# Patient Record
Sex: MALE | Race: BLACK OR AFRICAN AMERICAN | NOT HISPANIC OR LATINO | Employment: OTHER | URBAN - METROPOLITAN AREA
[De-identification: names, ages, dates, MRNs, and addresses within clinical notes are randomized per-mention and may not be internally consistent; named-entity substitution may affect disease eponyms.]

---

## 2018-01-17 ENCOUNTER — HOSPITAL ENCOUNTER (EMERGENCY)
Facility: OTHER | Age: 58
Discharge: HOME | End: 2018-01-17
Attending: EMERGENCY MEDICINE
Payer: MEDICAID

## 2018-01-17 VITALS
SYSTOLIC BLOOD PRESSURE: 153 MMHG | TEMPERATURE: 98 F | DIASTOLIC BLOOD PRESSURE: 60 MMHG | OXYGEN SATURATION: 98 % | HEIGHT: 69 IN | RESPIRATION RATE: 19 BRPM | HEART RATE: 68 BPM | BODY MASS INDEX: 22.22 KG/M2 | WEIGHT: 150 LBS

## 2018-01-17 DIAGNOSIS — M62.838 NECK MUSCLE SPASM: Primary | ICD-10-CM

## 2018-01-17 DIAGNOSIS — B07.9 FILIFORM WARTS: ICD-10-CM

## 2018-01-17 PROCEDURE — 99283 EMERGENCY DEPT VISIT LOW MDM: CPT

## 2018-01-17 PROCEDURE — 25000003 PHARM REV CODE 250: Performed by: PHYSICIAN ASSISTANT

## 2018-01-17 RX ORDER — BENZTROPINE MESYLATE 2 MG/1
1 TABLET ORAL 2 TIMES DAILY
COMMUNITY
End: 2020-02-19

## 2018-01-17 RX ORDER — METHOCARBAMOL 500 MG/1
1000 TABLET, FILM COATED ORAL
Status: COMPLETED | OUTPATIENT
Start: 2018-01-17 | End: 2018-01-17

## 2018-01-17 RX ORDER — DIPHENHYDRAMINE HCL 25 MG
25 TABLET ORAL 2 TIMES DAILY
COMMUNITY
End: 2019-04-12

## 2018-01-17 RX ORDER — IBUPROFEN 600 MG/1
600 TABLET ORAL
Status: COMPLETED | OUTPATIENT
Start: 2018-01-17 | End: 2018-01-17

## 2018-01-17 RX ORDER — METHOCARBAMOL 500 MG/1
1000 TABLET, FILM COATED ORAL 3 TIMES DAILY
Qty: 24 TABLET | Refills: 0 | Status: SHIPPED | OUTPATIENT
Start: 2018-01-17 | End: 2018-01-22

## 2018-01-17 RX ADMIN — METHOCARBAMOL 1000 MG: 500 TABLET ORAL at 01:01

## 2018-01-17 RX ADMIN — IBUPROFEN 600 MG: 600 TABLET, FILM COATED ORAL at 01:01

## 2018-01-17 NOTE — ED PROVIDER NOTES
"Encounter Date: 1/17/2018       History     Chief Complaint   Patient presents with    Multiple Medical Complaints     upper shoulder/back pain x 2 weeks and "I got these things growing on my face"     Patient is a 57-year-old male with schizophrenia who presents to the ED with neck pain and lesions on his face.  Patient states he woke up 3 days ago with right-sided neck pain that radiates into his right shoulder.  He denies any injury.  He states it feels like a "muscle spasm".  He reports taking ibuprofen and applying analgesic gel to this area without relief.  He denies any numbness or tingling.  He also reports three-month history of lesions to his chin.  He denies any irritation, drainage, swelling, or bleeding to these lesions.  He states these lesions were exacerbated after shaving.  He denies any genital lesions.  Upon reviewing old medical records, patient has been seen here for similar presentation and was diagnosed with cutaneous warts.  Patient denies receiving any prior treatment for this in the past.      The history is provided by the patient.     Review of patient's allergies indicates:  No Known Allergies  Past Medical History:   Diagnosis Date    Schizophrenia      History reviewed. No pertinent surgical history.  History reviewed. No pertinent family history.  Social History   Substance Use Topics    Smoking status: Current Every Day Smoker    Smokeless tobacco: Not on file    Alcohol use No     Review of Systems   Constitutional: Negative for chills and fever.   HENT: Negative for congestion and sore throat.    Eyes: Negative for pain.   Respiratory: Negative for shortness of breath.    Cardiovascular: Negative for chest pain.   Gastrointestinal: Negative for abdominal pain, diarrhea, nausea and vomiting.   Genitourinary: Negative for dysuria.   Musculoskeletal: Positive for neck pain.   Skin:        Skin lesions to face   Neurological: Negative for headaches.       Physical Exam     Initial " Vitals [01/17/18 1138]   BP Pulse Resp Temp SpO2   (!) 146/68 65 16 97.2 °F (36.2 °C) 100 %      MAP       94         Physical Exam    Constitutional: He is not diaphoretic. He is cooperative. No distress.   HENT:   Head: Normocephalic and atraumatic.   Mouth/Throat: Oropharynx is clear and moist.   Eyes: Conjunctivae and EOM are normal. Pupils are equal, round, and reactive to light.   Neck: Normal range of motion. Neck supple.   Cardiovascular: Normal rate, regular rhythm and intact distal pulses.   Pulmonary/Chest: Breath sounds normal. He has no wheezes. He has no rhonchi. He has no rales.   Abdominal: Soft. Bowel sounds are normal. There is no tenderness. There is no rebound and no guarding.   Musculoskeletal:   Full range of motion to right shoulder.  No pain with internal/external rotation.  No obvious deformity. No CTL midline tenderness, crepitus, masses, or step-offs.  Pain with palpation to the right sternocleidomastoid muscle with associated muscle spasm.   Neurological: He is alert and oriented to person, place, and time. He has normal strength. GCS eye subscore is 4. GCS verbal subscore is 5. GCS motor subscore is 6.   Skin: Skin is warm and dry. Capillary refill takes less than 2 seconds.   Multiple, verrucous papular lesions to the chin. No erythema, pustules or drainage   Psychiatric: He has a normal mood and affect. His behavior is normal.         ED Course   Procedures  Labs Reviewed - No data to display          Medical Decision Making:   Initial Assessment:   Urgent evaluation of a 57 y.o. male presenting to the emergency department complaining of facial lesions the neck pain. Patient is afebrile, nontoxic appearing and hemodynamically stable.  ED Management:  Physical exam is consistent with muscle spasm of neck.  There are no signs of saddle anesthesia, incontinence, neurologic deficits, fevers, trauma or midline tenderness on history or physical to suggest cauda equina, infectious process,  fracture or subluxation.  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  He denies genital lesions Advised that he follow-up with his primary care for cryotherapy or pharmacological intervention.  I'll send him home with muscle relaxers for his neck spasm.  Have given specific return cautions for new or worsening symptoms.  Other:   I have discussed this case with another health care provider.  This note was created using Dragon Medical Dictation. There may be typographical errors secondary to dictation.                    ED Course      Clinical Impression:     1. Neck muscle spasm    2. Filiform warts                             Parvez Rebollar PA-C  01/17/18 6747

## 2019-03-14 ENCOUNTER — HOSPITAL ENCOUNTER (EMERGENCY)
Facility: OTHER | Age: 59
Discharge: HOME | End: 2019-03-14
Attending: EMERGENCY MEDICINE
Payer: MEDICAID

## 2019-03-14 VITALS
DIASTOLIC BLOOD PRESSURE: 64 MMHG | OXYGEN SATURATION: 97 % | TEMPERATURE: 98 F | RESPIRATION RATE: 17 BRPM | BODY MASS INDEX: 20.73 KG/M2 | HEART RATE: 56 BPM | SYSTOLIC BLOOD PRESSURE: 140 MMHG | WEIGHT: 140 LBS | HEIGHT: 69 IN

## 2019-03-14 DIAGNOSIS — M79.89 CALF SWELLING: Primary | ICD-10-CM

## 2019-03-14 DIAGNOSIS — M79.605 PAIN OF LEFT LOWER EXTREMITY: ICD-10-CM

## 2019-03-14 LAB
ALBUMIN SERPL BCP-MCNC: 3.9 G/DL
ALP SERPL-CCNC: 82 U/L
ALT SERPL W/O P-5'-P-CCNC: 13 U/L
ANION GAP SERPL CALC-SCNC: 8 MMOL/L
APTT BLDCRRT: 27.1 SEC
AST SERPL-CCNC: 19 U/L
BASOPHILS # BLD AUTO: 0.01 K/UL
BASOPHILS NFR BLD: 0.3 %
BILIRUB SERPL-MCNC: 0.5 MG/DL
BUN SERPL-MCNC: 6 MG/DL
CALCIUM SERPL-MCNC: 9.4 MG/DL
CHLORIDE SERPL-SCNC: 98 MMOL/L
CO2 SERPL-SCNC: 27 MMOL/L
CREAT SERPL-MCNC: 1.1 MG/DL
DIFFERENTIAL METHOD: ABNORMAL
EOSINOPHIL # BLD AUTO: 0.1 K/UL
EOSINOPHIL NFR BLD: 3.3 %
ERYTHROCYTE [DISTWIDTH] IN BLOOD BY AUTOMATED COUNT: 12.9 %
EST. GFR  (AFRICAN AMERICAN): >60 ML/MIN/1.73 M^2
EST. GFR  (NON AFRICAN AMERICAN): >60 ML/MIN/1.73 M^2
GLUCOSE SERPL-MCNC: 87 MG/DL
HCT VFR BLD AUTO: 38.2 %
HGB BLD-MCNC: 12.7 G/DL
HIV1+2 IGG SERPL QL IA.RAPID: NEGATIVE
INR PPP: 1
LYMPHOCYTES # BLD AUTO: 0.4 K/UL
LYMPHOCYTES NFR BLD: 12.3 %
MCH RBC QN AUTO: 28.7 PG
MCHC RBC AUTO-ENTMCNC: 33.2 G/DL
MCV RBC AUTO: 86 FL
MONOCYTES # BLD AUTO: 0.2 K/UL
MONOCYTES NFR BLD: 6.3 %
NEUTROPHILS # BLD AUTO: 2.6 K/UL
NEUTROPHILS NFR BLD: 77.5 %
PLATELET # BLD AUTO: 269 K/UL
PMV BLD AUTO: 9.9 FL
POTASSIUM SERPL-SCNC: 4.8 MMOL/L
PROT SERPL-MCNC: 7.3 G/DL
PROTHROMBIN TIME: 11 SEC
RBC # BLD AUTO: 4.42 M/UL
SODIUM SERPL-SCNC: 133 MMOL/L
WBC # BLD AUTO: 3.32 K/UL

## 2019-03-14 PROCEDURE — 86703 HIV-1/HIV-2 1 RESULT ANTBDY: CPT | Mod: 91

## 2019-03-14 PROCEDURE — 86703 HIV-1/HIV-2 1 RESULT ANTBDY: CPT

## 2019-03-14 PROCEDURE — 36415 COLL VENOUS BLD VENIPUNCTURE: CPT

## 2019-03-14 PROCEDURE — 80053 COMPREHEN METABOLIC PANEL: CPT

## 2019-03-14 PROCEDURE — 85025 COMPLETE CBC W/AUTO DIFF WBC: CPT

## 2019-03-14 PROCEDURE — 85610 PROTHROMBIN TIME: CPT

## 2019-03-14 PROCEDURE — 85730 THROMBOPLASTIN TIME PARTIAL: CPT

## 2019-03-14 PROCEDURE — 99284 EMERGENCY DEPT VISIT MOD MDM: CPT

## 2019-03-14 RX ORDER — IBUPROFEN 600 MG/1
600 TABLET ORAL EVERY 6 HOURS PRN
Qty: 20 TABLET | Refills: 0 | Status: SHIPPED | OUTPATIENT
Start: 2019-03-14 | End: 2019-04-12

## 2019-03-14 RX ORDER — METHOCARBAMOL 500 MG/1
1000 TABLET, FILM COATED ORAL 3 TIMES DAILY PRN
Qty: 30 TABLET | Refills: 0 | Status: SHIPPED | OUTPATIENT
Start: 2019-03-14 | End: 2019-03-19

## 2019-03-14 NOTE — ED PROVIDER NOTES
"Encounter Date: 3/14/2019    SCRIBE #1 NOTE: I, Uyen Flores, am scribing for, and in the presence of, Dr. Umana.       History     Chief Complaint   Patient presents with    Leg Pain     Pt c/o left knee and left calf pain for the past three weeks. Pt also c/o multiple "warts to face."     Time seen by provider: 11:22 AM    This is a 58 y.o. male with hx of schizophrenia who presents with complaint of LLE pain for one week. He states that pain is to the entire leg, from hip to ankle. Pain is exacerbated with ambulation. He reports associated swelling to the calf. Pt also c/o warts to the chin and around the month, ongoing for many months. He states that warts are not painful. Pt reports no fever, chills, N/V, abdominal pain, chest pain, SOB, cough, neck pain, HA, numbness, tingling, weakness, wound, or confusion. Pt admits to smoking, but does not drink or use illicit drugs.      The history is provided by the patient.     Review of patient's allergies indicates:  No Known Allergies  Past Medical History:   Diagnosis Date    Schizophrenia      History reviewed. No pertinent surgical history.  History reviewed. No pertinent family history.  Social History     Tobacco Use    Smoking status: Current Every Day Smoker   Substance Use Topics    Alcohol use: No    Drug use: No     Review of Systems   Constitutional: Negative for chills and fever.   HENT: Negative for congestion and sore throat.    Eyes: Negative for visual disturbance.   Respiratory: Negative for cough and shortness of breath.    Cardiovascular: Positive for leg swelling (LLE). Negative for chest pain and palpitations.   Gastrointestinal: Negative for abdominal pain, diarrhea, nausea and vomiting.   Genitourinary: Negative for decreased urine volume, dysuria and frequency.   Musculoskeletal: Negative for joint swelling, neck pain and neck stiffness.        Positive for LLE pain.   Skin: Negative for wound.        Positive for warts to the face. "   Neurological: Negative for weakness, numbness and headaches.        Negative for tingling.   Psychiatric/Behavioral: Negative for behavioral problems and confusion.       Physical Exam     Initial Vitals [03/14/19 1051]   BP Pulse Resp Temp SpO2   101/64 104 18 98.5 °F (36.9 °C) 95 %      MAP       --         Physical Exam    Nursing note and vitals reviewed.  Constitutional: He appears well-developed and well-nourished. No distress.   HENT:   Head: Normocephalic and atraumatic.   Cobblestoning and posterior erythema. No tonsillar enlargement or exudate.   Eyes: Conjunctivae and EOM are normal. Pupils are equal, round, and reactive to light.   Neck: Normal range of motion. Neck supple.   Cardiovascular: Normal rate, regular rhythm and normal heart sounds.   No murmur heard.  DP pulses 2+ and equal.   Pulmonary/Chest: Breath sounds normal. No respiratory distress. He has no wheezes. He has no rhonchi. He has no rales.   Abdominal: Soft. Bowel sounds are normal. There is no tenderness.   Musculoskeletal: Normal range of motion.   Neurological: He is alert and oriented to person, place, and time. He has normal strength. No cranial nerve deficit or sensory deficit.   No foot drop.  Negative Keenan test.   Skin: Skin is warm and dry. No rash noted.   L calf increased in size when compared to the R. Compartments are soft.  Both extremities are equally warm. No erythema. Dried skin over bilateral shin.   There are multiple papules with verrucous filiform morphology, diffusely over the pt's beard area and one on top of the L pinna of the ear. No erythema or purulence.   Psychiatric: He has a normal mood and affect. His behavior is normal.         ED Course   Procedures  Labs Reviewed   CBC W/ AUTO DIFFERENTIAL - Abnormal; Notable for the following components:       Result Value    WBC 3.32 (*)     RBC 4.42 (*)     Hemoglobin 12.7 (*)     Hematocrit 38.2 (*)     Lymph # 0.4 (*)     Mono # 0.2 (*)     Gran% 77.5 (*)      Lymph% 12.3 (*)     All other components within normal limits   COMPREHENSIVE METABOLIC PANEL - Abnormal; Notable for the following components:    Sodium 133 (*)     All other components within normal limits   PROTIME-INR    Narrative:     Recoll. [id number] by PLM at 03/14/2019 12:36, reason: Overfilled.   Spoke with Codi Calvert RN ER at 1236. Lab will recollect.   APTT    Narrative:     Recoll. [id number] by PLM at 03/14/2019 12:36, reason: Overfilled.   Spoke with Codi Calvert RN ER at 1236. Lab will recollect.   RAPID HIV   HIV 1 / 2 ANTIBODY          Imaging Results          US Lower Extremity Veins Left (Final result)  Result time 03/14/19 13:39:21    Final result by Ulysses España MD (03/14/19 13:39:21)                 Impression:      No evidence of deep venous thrombosis in the left lower extremity.      Electronically signed by: Ulysses España MD  Date:    03/14/2019  Time:    13:39             Narrative:    EXAMINATION:  US LOWER EXTREMITY VEINS LEFT    CLINICAL HISTORY:  Other specified soft tissue disorders    TECHNIQUE:  Duplex and color flow Doppler evaluation and graded compression of the left lower extremity veins was performed.    COMPARISON:  None    FINDINGS:  Left thigh veins: The common femoral, femoral, popliteal, upper greater saphenous, and deep femoral veins are patent and free of thrombus. The veins are normally compressible and have normal phasic flow and augmentation response.    Left calf veins: The visualized calf veins are patent.    Contralateral CFV: The contralateral (right) common femoral vein is patent and free of thrombus.    Miscellaneous: None                                 Medical Decision Making:   Clinical Tests:   Lab Tests: Ordered and Reviewed  Radiological Study: Ordered  ED Management:  Emergent evaluation a 58-year-old male who presents with complaint of left calf pain and swelling.  Vital signs are benign, afebrile.  Physical exam reveals a swollen left calf a,  although compartments are soft.  No evidence of Achilles rupture.  No evidence of cellulitis.  No evidence of vascular compromise.  I was concerned for a DVT.  Lab workup and ultrasound are benign.  Ultimately he is discharged in good condition with an anti-inflammatory and Robaxin for muscle relaxer.  I have encouraged close follow-up with PCP or to return for any new or worsening symptoms.            Scribe Attestation:   Scribe #1: I performed the above scribed service and the documentation accurately describes the services I performed. I attest to the accuracy of the note.    Attending Attestation:           Physician Attestation for Scribe:  Physician Attestation Statement for Scribe #1: I, Dr. Umana, reviewed documentation, as scribed by Uyen Flores in my presence, and it is both accurate and complete.                    Clinical Impression:     1. Calf swelling    2. Pain of left lower extremity                                 Luli Umana MD  03/14/19 0682

## 2019-03-14 NOTE — ED TRIAGE NOTES
PT states he does a lot of walking, reports pain to left calf x 2 to 3 weeks, w/ swelling noted to left calf. Pt reports 10 out of 10 pain w/ walking. PT is AAO x 3, answers questions appropriately.

## 2019-03-15 LAB — HIV 1+2 AB+HIV1 P24 AG SERPL QL IA: NEGATIVE

## 2019-04-12 ENCOUNTER — HOSPITAL ENCOUNTER (INPATIENT)
Facility: OTHER | Age: 59
LOS: 2 days | Discharge: HOME | DRG: 641 | End: 2019-04-14
Attending: EMERGENCY MEDICINE | Admitting: EMERGENCY MEDICINE
Payer: MEDICAID

## 2019-04-12 DIAGNOSIS — D64.9 ANEMIA, UNSPECIFIED TYPE: ICD-10-CM

## 2019-04-12 DIAGNOSIS — E88.09 HYPOALBUMINEMIA: ICD-10-CM

## 2019-04-12 DIAGNOSIS — B07.9 FILIFORM WARTS: ICD-10-CM

## 2019-04-12 DIAGNOSIS — R62.7 FAILURE TO THRIVE IN ADULT: ICD-10-CM

## 2019-04-12 DIAGNOSIS — E86.1 HYPOTENSION DUE TO HYPOVOLEMIA: ICD-10-CM

## 2019-04-12 DIAGNOSIS — I95.9 HYPOTENSION, UNSPECIFIED HYPOTENSION TYPE: ICD-10-CM

## 2019-04-12 DIAGNOSIS — F20.9 SCHIZOPHRENIA, UNSPECIFIED TYPE: ICD-10-CM

## 2019-04-12 DIAGNOSIS — E87.1 HYPONATREMIA: Primary | ICD-10-CM

## 2019-04-12 LAB
ALBUMIN SERPL BCP-MCNC: 2.7 G/DL (ref 3.5–5.2)
ALP SERPL-CCNC: 95 U/L (ref 55–135)
ALT SERPL W/O P-5'-P-CCNC: 85 U/L (ref 10–44)
ANION GAP SERPL CALC-SCNC: 11 MMOL/L (ref 8–16)
ANION GAP SERPL CALC-SCNC: 9 MMOL/L (ref 8–16)
AST SERPL-CCNC: 86 U/L (ref 10–40)
BACTERIA #/AREA URNS HPF: ABNORMAL /HPF
BASOPHILS # BLD AUTO: 0.01 K/UL (ref 0–0.2)
BASOPHILS NFR BLD: 0.2 % (ref 0–1.9)
BILIRUB SERPL-MCNC: 1.2 MG/DL (ref 0.1–1)
BILIRUB UR QL STRIP: NEGATIVE
BUN SERPL-MCNC: 23 MG/DL (ref 6–20)
BUN SERPL-MCNC: 25 MG/DL (ref 6–20)
CALCIUM SERPL-MCNC: 8.6 MG/DL (ref 8.7–10.5)
CALCIUM SERPL-MCNC: 8.8 MG/DL (ref 8.7–10.5)
CHLORIDE SERPL-SCNC: 87 MMOL/L (ref 95–110)
CHLORIDE SERPL-SCNC: 90 MMOL/L (ref 95–110)
CLARITY UR: ABNORMAL
CO2 SERPL-SCNC: 22 MMOL/L (ref 23–29)
CO2 SERPL-SCNC: 27 MMOL/L (ref 23–29)
COLOR UR: YELLOW
CREAT SERPL-MCNC: 1.2 MG/DL (ref 0.5–1.4)
CREAT SERPL-MCNC: 1.4 MG/DL (ref 0.5–1.4)
DIFFERENTIAL METHOD: ABNORMAL
EOSINOPHIL # BLD AUTO: 0 K/UL (ref 0–0.5)
EOSINOPHIL NFR BLD: 0.5 % (ref 0–8)
ERYTHROCYTE [DISTWIDTH] IN BLOOD BY AUTOMATED COUNT: 13.1 % (ref 11.5–14.5)
EST. GFR  (AFRICAN AMERICAN): >60 ML/MIN/1.73 M^2
EST. GFR  (AFRICAN AMERICAN): >60 ML/MIN/1.73 M^2
EST. GFR  (NON AFRICAN AMERICAN): 55 ML/MIN/1.73 M^2
EST. GFR  (NON AFRICAN AMERICAN): >60 ML/MIN/1.73 M^2
GLUCOSE SERPL-MCNC: 77 MG/DL (ref 70–110)
GLUCOSE SERPL-MCNC: 96 MG/DL (ref 70–110)
GLUCOSE UR QL STRIP: NEGATIVE
HCT VFR BLD AUTO: 26.1 % (ref 40–54)
HGB BLD-MCNC: 9.1 G/DL (ref 14–18)
HGB UR QL STRIP: ABNORMAL
KETONES UR QL STRIP: NEGATIVE
LEUKOCYTE ESTERASE UR QL STRIP: ABNORMAL
LYMPHOCYTES # BLD AUTO: 0.2 K/UL (ref 1–4.8)
LYMPHOCYTES NFR BLD: 2.7 % (ref 18–48)
MCH RBC QN AUTO: 28.6 PG (ref 27–31)
MCHC RBC AUTO-ENTMCNC: 34.9 G/DL (ref 32–36)
MCV RBC AUTO: 82 FL (ref 82–98)
MICROSCOPIC COMMENT: ABNORMAL
MONOCYTES # BLD AUTO: 0.5 K/UL (ref 0.3–1)
MONOCYTES NFR BLD: 8.6 % (ref 4–15)
NEUTROPHILS # BLD AUTO: 5.2 K/UL (ref 1.8–7.7)
NEUTROPHILS NFR BLD: 87.5 % (ref 38–73)
NITRITE UR QL STRIP: POSITIVE
OSMOLALITY SERPL: 257 MOSM/KG (ref 280–300)
PH UR STRIP: 6 [PH] (ref 5–8)
PLATELET # BLD AUTO: 201 K/UL (ref 150–350)
PMV BLD AUTO: 10.5 FL (ref 9.2–12.9)
POTASSIUM SERPL-SCNC: 3.8 MMOL/L (ref 3.5–5.1)
POTASSIUM SERPL-SCNC: 4.2 MMOL/L (ref 3.5–5.1)
PROT SERPL-MCNC: 6.3 G/DL (ref 6–8.4)
PROT UR QL STRIP: ABNORMAL
RBC # BLD AUTO: 3.18 M/UL (ref 4.6–6.2)
RBC #/AREA URNS HPF: 9 /HPF (ref 0–4)
SODIUM SERPL-SCNC: 123 MMOL/L (ref 136–145)
SODIUM SERPL-SCNC: 123 MMOL/L (ref 136–145)
SP GR UR STRIP: <=1.005 (ref 1–1.03)
SQUAMOUS #/AREA URNS HPF: 3 /HPF
URN SPEC COLLECT METH UR: ABNORMAL
UROBILINOGEN UR STRIP-ACNC: 1 EU/DL
WBC # BLD AUTO: 5.93 K/UL (ref 3.9–12.7)
WBC #/AREA URNS HPF: >100 /HPF (ref 0–5)
WBC CLUMPS URNS QL MICRO: ABNORMAL

## 2019-04-12 PROCEDURE — 87186 SC STD MICRODIL/AGAR DIL: CPT

## 2019-04-12 PROCEDURE — 87077 CULTURE AEROBIC IDENTIFY: CPT

## 2019-04-12 PROCEDURE — 83930 ASSAY OF BLOOD OSMOLALITY: CPT

## 2019-04-12 PROCEDURE — 80053 COMPREHEN METABOLIC PANEL: CPT

## 2019-04-12 PROCEDURE — 83935 ASSAY OF URINE OSMOLALITY: CPT

## 2019-04-12 PROCEDURE — 99223 1ST HOSP IP/OBS HIGH 75: CPT | Mod: ,,, | Performed by: HOSPITALIST

## 2019-04-12 PROCEDURE — 36415 COLL VENOUS BLD VENIPUNCTURE: CPT

## 2019-04-12 PROCEDURE — 99285 EMERGENCY DEPT VISIT HI MDM: CPT | Mod: 25

## 2019-04-12 PROCEDURE — 63600175 PHARM REV CODE 636 W HCPCS: Performed by: EMERGENCY MEDICINE

## 2019-04-12 PROCEDURE — 85025 COMPLETE CBC W/AUTO DIFF WBC: CPT

## 2019-04-12 PROCEDURE — 87088 URINE BACTERIA CULTURE: CPT

## 2019-04-12 PROCEDURE — 81000 URINALYSIS NONAUTO W/SCOPE: CPT

## 2019-04-12 PROCEDURE — 94761 N-INVAS EAR/PLS OXIMETRY MLT: CPT

## 2019-04-12 PROCEDURE — 87086 URINE CULTURE/COLONY COUNT: CPT

## 2019-04-12 PROCEDURE — 25000003 PHARM REV CODE 250: Performed by: HOSPITALIST

## 2019-04-12 PROCEDURE — 25000003 PHARM REV CODE 250: Performed by: EMERGENCY MEDICINE

## 2019-04-12 PROCEDURE — 80048 BASIC METABOLIC PNL TOTAL CA: CPT

## 2019-04-12 PROCEDURE — 11000001 HC ACUTE MED/SURG PRIVATE ROOM

## 2019-04-12 PROCEDURE — 96361 HYDRATE IV INFUSION ADD-ON: CPT

## 2019-04-12 PROCEDURE — 96365 THER/PROPH/DIAG IV INF INIT: CPT

## 2019-04-12 PROCEDURE — 99223 PR INITIAL HOSPITAL CARE,LEVL III: ICD-10-PCS | Mod: ,,, | Performed by: HOSPITALIST

## 2019-04-12 RX ORDER — DIPHENHYDRAMINE HCL 25 MG
25 CAPSULE ORAL EVERY 6 HOURS PRN
Status: DISCONTINUED | OUTPATIENT
Start: 2019-04-12 | End: 2019-04-14 | Stop reason: HOSPADM

## 2019-04-12 RX ORDER — SODIUM CHLORIDE, SODIUM LACTATE, POTASSIUM CHLORIDE, CALCIUM CHLORIDE 600; 310; 30; 20 MG/100ML; MG/100ML; MG/100ML; MG/100ML
INJECTION, SOLUTION INTRAVENOUS CONTINUOUS
Status: DISCONTINUED | OUTPATIENT
Start: 2019-04-12 | End: 2019-04-14 | Stop reason: HOSPADM

## 2019-04-12 RX ORDER — SODIUM CHLORIDE 0.9 % (FLUSH) 0.9 %
10 SYRINGE (ML) INJECTION
Status: DISCONTINUED | OUTPATIENT
Start: 2019-04-12 | End: 2019-04-14 | Stop reason: HOSPADM

## 2019-04-12 RX ORDER — HALOPERIDOL 5 MG/1
10 TABLET ORAL 2 TIMES DAILY
Status: DISCONTINUED | OUTPATIENT
Start: 2019-04-12 | End: 2019-04-14 | Stop reason: HOSPADM

## 2019-04-12 RX ORDER — ONDANSETRON 2 MG/ML
4 INJECTION INTRAMUSCULAR; INTRAVENOUS EVERY 8 HOURS PRN
Status: DISCONTINUED | OUTPATIENT
Start: 2019-04-12 | End: 2019-04-14 | Stop reason: HOSPADM

## 2019-04-12 RX ORDER — BENZTROPINE MESYLATE 1 MG/1
1 TABLET ORAL 2 TIMES DAILY
Status: DISCONTINUED | OUTPATIENT
Start: 2019-04-12 | End: 2019-04-14 | Stop reason: HOSPADM

## 2019-04-12 RX ORDER — ACETAMINOPHEN 325 MG/1
650 TABLET ORAL EVERY 4 HOURS PRN
Status: DISCONTINUED | OUTPATIENT
Start: 2019-04-12 | End: 2019-04-14 | Stop reason: HOSPADM

## 2019-04-12 RX ADMIN — SODIUM CHLORIDE 1000 ML: 0.9 INJECTION, SOLUTION INTRAVENOUS at 10:04

## 2019-04-12 RX ADMIN — CEFTRIAXONE 1 G: 1 INJECTION, SOLUTION INTRAVENOUS at 02:04

## 2019-04-12 RX ADMIN — BENZTROPINE MESYLATE 1 MG: 1 TABLET ORAL at 09:04

## 2019-04-12 RX ADMIN — HALOPERIDOL 10 MG: 5 TABLET ORAL at 09:04

## 2019-04-12 RX ADMIN — SODIUM CHLORIDE, SODIUM LACTATE, POTASSIUM CHLORIDE, AND CALCIUM CHLORIDE: 600; 310; 30; 20 INJECTION, SOLUTION INTRAVENOUS at 04:04

## 2019-04-12 NOTE — SUBJECTIVE & OBJECTIVE
Past Medical History:   Diagnosis Date    Schizophrenia        Past Surgical History:   Procedure Laterality Date    CLAVICLE SURGERY Right 1976       Review of patient's allergies indicates:  No Known Allergies    No current facility-administered medications on file prior to encounter.      Current Outpatient Medications on File Prior to Encounter   Medication Sig    benztropine (COGENTIN) 2 MG Tab Take 1 mg by mouth 2 (two) times daily.    haloperidol (HALDOL) 10 MG tablet Take 10 mg by mouth 2 (two) times daily.     [DISCONTINUED] diphenhydrAMINE (SOMINEX) 25 mg tablet Take 25 mg by mouth 2 (two) times daily.    [DISCONTINUED] ibuprofen (ADVIL,MOTRIN) 600 MG tablet Take 1 tablet (600 mg total) by mouth every 6 (six) hours as needed for Pain.     Family History     Problem Relation (Age of Onset)    Cancer Mother        Tobacco Use    Smoking status: Current Every Day Smoker     Types: Cigarettes    Smokeless tobacco: Never Used    Tobacco comment: Reports he quit around 4/1/19   Substance and Sexual Activity    Alcohol use: No    Drug use: No    Sexual activity: Not on file     Review of Systems   Constitutional: Positive for appetite change. Negative for chills and fever.   HENT: Negative for rhinorrhea and sore throat.         Dysgeusia   Eyes: Negative for photophobia and visual disturbance.   Respiratory: Negative for cough and shortness of breath.    Cardiovascular: Negative for chest pain and palpitations.   Gastrointestinal: Negative for diarrhea and nausea.   Genitourinary: Negative for dysuria and frequency.   Musculoskeletal: Negative for back pain and gait problem.   Neurological: Negative for weakness and headaches.   Psychiatric/Behavioral: Negative for confusion and dysphoric mood.     Objective:     Vital Signs (Most Recent):  Temp: 99.2 °F (37.3 °C) (04/12/19 1013)  Pulse: 64 (04/12/19 1354)  Resp: 18 (04/12/19 1013)  BP: 104/63 (04/12/19 1354)  SpO2: 99 % (04/12/19 1354) Vital Signs  (24h Range):  Temp:  [99.2 °F (37.3 °C)] 99.2 °F (37.3 °C)  Pulse:  [62-94] 64  Resp:  [18] 18  SpO2:  [99 %-100 %] 99 %  BP: ()/(54-71) 104/63     Weight: 64 kg (141 lb)  Body mass index is 20.82 kg/m².    Physical Exam   Constitutional: He is oriented to person, place, and time. He appears well-developed and well-nourished. No distress.   HENT:   Head: Normocephalic.   Edentulous   Eyes: Pupils are equal, round, and reactive to light. Conjunctivae are normal.   Neck: Neck supple. No thyromegaly present.   Cardiovascular: Normal rate, regular rhythm and normal heart sounds. Exam reveals no gallop and no friction rub.   No murmur heard.  Pulses:       Dorsalis pedis pulses are 1+ on the right side, and 1+ on the left side.   Pulmonary/Chest: Effort normal and breath sounds normal.   Abdominal: Soft. Bowel sounds are normal. He exhibits no distension. There is no tenderness.   Musculoskeletal: Normal range of motion. He exhibits no edema.   Lymphadenopathy:     He has no cervical adenopathy.   Neurological: He is alert and oriented to person, place, and time.   Strength equal and symmetric   Skin: Skin is warm and dry. No rash noted.   Multiple verrucous lesions in beard distribution of face.  Skin of lower legs dry and flaking.   Psychiatric: He has a normal mood and affect. His behavior is normal. Thought content normal.         CRANIAL NERVES     CN III, IV, VI   Pupils are equal, round, and reactive to light.       Significant Labs:   BMP:   Recent Labs   Lab 04/12/19  1054   GLU 96   *   K 3.8   CL 87*   CO2 27   BUN 25*   CREATININE 1.4   CALCIUM 8.6*       Significant Imaging: I have reviewed all pertinent imaging results/findings within the past 24 hours.

## 2019-04-12 NOTE — ED PROVIDER NOTES
"Encounter Date: 4/12/2019    SCRIBE #1 NOTE: I, Uyen Flores, am scribing for, and in the presence of, Dr. Duffy.       History     Chief Complaint   Patient presents with    Fatigue     Pt reports generalized weakness for the past several days. Pt reports " I just dont eat because the food taste bad and I have a bad taste in my mouth" Pt denies vomiting recently. Unsure of diarrhea.      Time seen by provider: 10:35 AM    This is a 58 y.o. male who presents with complaint of fatigue for the past few days. Family member at bedside states that he is also not eating as his food "wasn't tasting right." Pt reports associated diarrhea with "watery" BMs. Pt was seen in this ED on 3/14/19 for LLE pain, started on ibuprofen q8h and robaxin. He has not been taking these medications for a week now. Family reports no other medication changes. Pt reports no f/c, N/V, abdominal pain, chest pain, leg swelling, SOB, back pain, HA, or weakness. No urinary sx. Pt admits to smoking.    The history is provided by the patient and a relative.     Review of patient's allergies indicates:  No Known Allergies  Past Medical History:   Diagnosis Date    Schizophrenia      Past Surgical History:   Procedure Laterality Date    CLAVICLE SURGERY Right 1976     Family History   Problem Relation Age of Onset    Cancer Mother         Unknown type     Social History     Tobacco Use    Smoking status: Current Every Day Smoker     Types: Cigarettes    Smokeless tobacco: Never Used    Tobacco comment: Reports he quit around 4/1/19   Substance Use Topics    Alcohol use: No    Drug use: No     Review of Systems   Constitutional: Positive for fatigue. Negative for chills and fever.   HENT: Negative for congestion, rhinorrhea and sore throat.    Eyes: Negative for photophobia and redness.   Respiratory: Negative for cough and shortness of breath.    Cardiovascular: Negative for chest pain.   Gastrointestinal: Positive for diarrhea. Negative " for abdominal pain, nausea and vomiting.   Endocrine: Negative for polyuria.   Genitourinary: Negative for decreased urine volume and dysuria.   Musculoskeletal: Negative for back pain.   Skin: Negative for rash.   Allergic/Immunologic: Negative for immunocompromised state.   Neurological: Negative for dizziness, weakness, light-headedness and headaches.   Hematological: Does not bruise/bleed easily.   Psychiatric/Behavioral: Negative for confusion.       Physical Exam     Initial Vitals [04/12/19 1013]   BP Pulse Resp Temp SpO2   (!) 85/54 94 18 99.2 °F (37.3 °C) 100 %      MAP       --         Physical Exam    Nursing note and vitals reviewed.  Constitutional: He appears well-developed and well-nourished. He is not diaphoretic. No distress.   HENT:   Head: Normocephalic and atraumatic.   Right Ear: External ear normal.   Left Ear: External ear normal.   Mouth/Throat: Oropharynx is clear and moist.   Eyes: Conjunctivae and EOM are normal. Pupils are equal, round, and reactive to light.   Neck: Normal range of motion. Neck supple.   Cardiovascular: Normal rate, regular rhythm and normal heart sounds.   Normal S1, S2. No murmurs, no rubs, no gallops.   Pulmonary/Chest: Breath sounds normal. No respiratory distress. He has no wheezes. He has no rhonchi. He has no rales.   Abdominal: Soft. Bowel sounds are normal. He exhibits no distension. There is no tenderness. There is no rebound and no guarding.   Musculoskeletal: Normal range of motion. He exhibits no edema or tenderness.   Lymphadenopathy:     He has no cervical adenopathy.   Neurological: He is alert and oriented to person, place, and time. He has normal strength. No sensory deficit.   Skin: Skin is warm and dry. No rash noted. No erythema.   Psychiatric: He has a normal mood and affect. His behavior is normal. Thought content normal.         ED Course   Procedures  Labs Reviewed   URINALYSIS, REFLEX TO URINE CULTURE - Abnormal; Notable for the following  components:       Result Value    Appearance, UA Hazy (*)     Specific Gravity, UA <=1.005 (*)     Protein, UA Trace (*)     Occult Blood UA 1+ (*)     Nitrite, UA Positive (*)     Leukocytes, UA 3+ (*)     All other components within normal limits    Narrative:     Preferred Collection Type->Urine, Clean Catch   CBC W/ AUTO DIFFERENTIAL - Abnormal; Notable for the following components:    RBC 3.18 (*)     Hemoglobin 9.1 (*)     Hematocrit 26.1 (*)     Lymph # 0.2 (*)     Gran% 87.5 (*)     Lymph% 2.7 (*)     All other components within normal limits   COMPREHENSIVE METABOLIC PANEL - Abnormal; Notable for the following components:    Sodium 123 (*)     Chloride 87 (*)     BUN, Bld 25 (*)     Calcium 8.6 (*)     Albumin 2.7 (*)     Total Bilirubin 1.2 (*)     AST 86 (*)     ALT 85 (*)     eGFR if non  55 (*)     All other components within normal limits   URINALYSIS MICROSCOPIC - Abnormal; Notable for the following components:    RBC, UA 9 (*)     WBC, UA >100 (*)     WBC Clumps, UA Occasional (*)     Bacteria, UA Many (*)     All other components within normal limits    Narrative:     Preferred Collection Type->Urine, Clean Catch             Medical Decision Making:   Clinical Tests:   Lab Tests: Ordered and Reviewed            Scribe Attestation:   Scribe #1: I performed the above scribed service and the documentation accurately describes the services I performed. I attest to the accuracy of the note.    Attending Attestation:           Physician Attestation for Scribe:  Physician Attestation Statement for Scribe #1: I, Dr. Duffy, reviewed documentation, as scribed by Uyen Flores in my presence, and it is both accurate and complete.         Attending ED Notes:   Emergent evaluation a 58-year-old male with chief complaint of generalized weakness, fatigue and loss of appetite.  Patient is afebrile, nontoxic-appearing stable vital signs.  Urinary analysis reveals urinary tract infection.  Sodium  is 123.  Chloride of 87.  Calcium of 8.6.  Patient has mild elevation in liver enzymes.  CBC reveals H&H 9.1 and 26.1.  No elevation white blood cell count.  The patient is extensively counseled on his diagnosis and treatment including all laboratory and physical exam findings.  The patient is admitted in stable condition.          ED Course as of Apr 13 2247 Fri Apr 12, 2019   1355 Consulted and discussed pt with Dr. Ch, will admit to Dr. Ferrera.    [CA]      ED Course User Index  [CA] Uyen Flores     Clinical Impression:     1. Hyponatremia    2. Failure to thrive in adult    3. Hypotension, unspecified hypotension type    4. Anemia, unspecified type    5. Hypoalbuminemia                                 Thomas Merritt MD  04/13/19 4581

## 2019-04-12 NOTE — ED TRIAGE NOTES
"Pt reports decreased appetite x 1 wk. Pt reports food "tastes bad" pt reports rash to mouth. White patches on sides of cheeks. Pt denies any n/v or abd discomfort. Denies any fever/chills/or body aches  "

## 2019-04-12 NOTE — ASSESSMENT & PLAN NOTE
- Has had this for years.  Follow up with [facility] dermatology as previously recommended

## 2019-04-12 NOTE — HPI
Mr. William is a 58 year old man who presented with decreased appetite and diarrhea for more than a week associated with a bad taste in his mouth.  He quit smoking and stopped eating as his food didn't taste right.  He was seen in the ED 4 weeks ago for LLE pain and had an ultrasound done that ruled out a DVT.  He was discharged on ibuprofen and Robaxin but has not been taking either, and he stopped taking his Haldol and Cogentin too.  Labs in ED were notable for sodium 123 with last sodium from the ED visit last month 133.  AST/ALT were up to 86/85 from normal.  Clean catch UA was done and showed a low specific gravity of <1.005, 1+ blood, positive nitrite, >100 WBC with clumps and many bacteria.  He was hypotensive with BP in the 80's on presentation.  He was given IV fluids, a dose of ceftriaxone and admitted for management of hyponatremia.    Medical history includes chronic schizophrenia for which he is on Haldol, Cogentin and Benadryl.  He denies other medical problems.  As noted above he quit smoking a couple of weeks ago.  He is disabled on SSI.  He has had verrucous lesions of the face that appear to be filiform warts for several years and has been referred to dermatology at [facility] but has not followed up.

## 2019-04-12 NOTE — ASSESSMENT & PLAN NOTE
- Lower than when checked last month when he presented for leg pain.  - Check urine and serum osmolality and fluid restrict if indicated.  For now continue IV fluids and monitor level  - He also reports he drinks a lot of water.  Might be dilutional and fluid restriction would be helpful.  - He denies diarrhea but this was reported by family member.  Does not appear volume depleted but that might be a contributor.  - Might also be side effect of Haldol.

## 2019-04-12 NOTE — ASSESSMENT & PLAN NOTE
- Low at baseline but lower than when checked last month when he presented for leg pain.  Patient hypotensive on presentation  - Urine and serum osmolality low with low urine sodium.  Indicates hypovolemic hyponatremia for which fluids are indicated.  - He also reports he drinks a lot of water.  Might be dilutional and fluid restriction would be helpful.  - Might also be side effect of Haldol.

## 2019-04-12 NOTE — H&P
"[facility] Hospital Medicine  History & Physical    Patient Name: Macario William  MRN: [id number]  Admission Date: 4/12/2019  Attending Physician: Jessica Frerera MD   Primary Care Provider: [facility]         Patient information was obtained from patient, past medical records and ER records.     Subjective:     Principal Problem:Hyponatremia    Chief Complaint:   Chief Complaint   Patient presents with    Fatigue     Pt reports generalized weakness for the past several days. Pt reports " I just dont eat because the food taste bad and I have a bad taste in my mouth" Pt denies vomiting recently. Unsure of diarrhea.         HPI: Mr. William is a 58 year old man who presented with decreased appetite for more than a week associated with a bad taste in his mouth.  He quit smoking and stopped eating as his food didn't taste right.  ED note states he had associated diarrhea but to me he denied this.  He was seen in the ED 4 weeks ago for LLE pain and had an ultrasound done that ruled out a DVT.  He was discharged on ibuprofen and Robaxin but has not been taking either, and he stopped taking his Haldol and Cogentin too.  Labs in ED were notable for sodium 123 with last sodium from the ED visit last month 133.  AST/ALT were up to 86/85 from normal.  Clean catch UA was done and showed a low specific gravity of <1.005, 1+ blood, positive nitrite, >100 WBC with clumps and many bacteria.  He was hypotensive with BP in the 80's on presentation.  He was given IV fluids, a dose of ceftriaxone and admitted for management of hyponatremia.    Medical history includes chronic schizophrenia for which he is on Haldol, Cogentin and Benadryl.  He denies other medical problems.  As noted above he quit smoking a couple of weeks ago.  He is disabled on SSI.  He has had verrucous lesions of the face that appear to be filiform warts for several years and has been referred to dermatology at [facility] but has not " followed up.    Past Medical History:   Diagnosis Date    Schizophrenia        Past Surgical History:   Procedure Laterality Date    CLAVICLE SURGERY Right 1976       Review of patient's allergies indicates:  No Known Allergies    No current facility-administered medications on file prior to encounter.      Current Outpatient Medications on File Prior to Encounter   Medication Sig    benztropine (COGENTIN) 2 MG Tab Take 1 mg by mouth 2 (two) times daily.    haloperidol (HALDOL) 10 MG tablet Take 10 mg by mouth 2 (two) times daily.     [DISCONTINUED] diphenhydrAMINE (SOMINEX) 25 mg tablet Take 25 mg by mouth 2 (two) times daily.    [DISCONTINUED] ibuprofen (ADVIL,MOTRIN) 600 MG tablet Take 1 tablet (600 mg total) by mouth every 6 (six) hours as needed for Pain.     Family History     Problem Relation (Age of Onset)    Cancer Mother        Tobacco Use    Smoking status: Current Every Day Smoker     Types: Cigarettes    Smokeless tobacco: Never Used    Tobacco comment: Reports he quit around 4/1/19   Substance and Sexual Activity    Alcohol use: No    Drug use: No    Sexual activity: Not on file     Review of Systems   Constitutional: Positive for appetite change. Negative for chills and fever.   HENT: Negative for rhinorrhea and sore throat.         Dysgeusia   Eyes: Negative for photophobia and visual disturbance.   Respiratory: Negative for cough and shortness of breath.    Cardiovascular: Negative for chest pain and palpitations.   Gastrointestinal: Negative for diarrhea and nausea.   Genitourinary: Negative for dysuria and frequency.   Musculoskeletal: Negative for back pain and gait problem.   Neurological: Negative for weakness and headaches.   Psychiatric/Behavioral: Negative for confusion and dysphoric mood.     Objective:     Vital Signs (Most Recent):  Temp: 99.2 °F (37.3 °C) (04/12/19 1013)  Pulse: 64 (04/12/19 1354)  Resp: 18 (04/12/19 1013)  BP: 104/63 (04/12/19 1354)  SpO2: 99 % (04/12/19  1354) Vital Signs (24h Range):  Temp:  [99.2 °F (37.3 °C)] 99.2 °F (37.3 °C)  Pulse:  [62-94] 64  Resp:  [18] 18  SpO2:  [99 %-100 %] 99 %  BP: ()/(54-71) 104/63     Weight: 64 kg (141 lb)  Body mass index is 20.82 kg/m².    Physical Exam   Constitutional: He is oriented to person, place, and time. He appears well-developed and well-nourished. No distress.   HENT:   Head: Normocephalic.   Edentulous   Eyes: Pupils are equal, round, and reactive to light. Conjunctivae are normal.   Neck: Neck supple. No thyromegaly present.   Cardiovascular: Normal rate, regular rhythm and normal heart sounds. Exam reveals no gallop and no friction rub.   No murmur heard.  Pulses:       Dorsalis pedis pulses are 1+ on the right side, and 1+ on the left side.   Pulmonary/Chest: Effort normal and breath sounds normal.   Abdominal: Soft. Bowel sounds are normal. He exhibits no distension. There is no tenderness.   Musculoskeletal: Normal range of motion. He exhibits no edema.   Lymphadenopathy:     He has no cervical adenopathy.   Neurological: He is alert and oriented to person, place, and time.   Strength equal and symmetric   Skin: Skin is warm and dry. No rash noted.   Multiple verrucous lesions in beard distribution of face.  Skin of lower legs dry and flaking.   Psychiatric: He has a normal mood and affect. His behavior is normal. Thought content normal.         CRANIAL NERVES     CN III, IV, VI   Pupils are equal, round, and reactive to light.       Significant Labs:   BMP:   Recent Labs   Lab 04/12/19  1054   GLU 96   *   K 3.8   CL 87*   CO2 27   BUN 25*   CREATININE 1.4   CALCIUM 8.6*       Significant Imaging: I have reviewed all pertinent imaging results/findings within the past 24 hours.    Assessment/Plan:     * Hyponatremia  - Lower than when checked last month when he presented for leg pain.  - Check urine and serum osmolality and fluid restrict if indicated.  For now continue IV fluids and monitor level  -  "He also reports he drinks a lot of water.  Might be dilutional and fluid restriction would be helpful.  - He denies diarrhea but this was reported by family member.  Hypotensive on presentation and improved with IV fluids.  Does not appear volume depleted but that might be a contributor.  - Might also be side effect of Haldol.      Hypotension due to hypovolemia  - As above under "hyponatremia."      Schizophrenia  - Probably chronic paranoid schizophrenia.  - Continue Haldol and Cogentin.      Filiform warts  - Has had this for years.  Follow up with [facility] dermatology as previously recommended      VTE Risk Mitigation (From admission, onward)    None             Jessica Moe MD  Department of Hospital Medicine   [facility]  "

## 2019-04-12 NOTE — ED NOTES
Hourly rounding on patient completed.  Pt made aware urine sample is needed.  Patient lying on stretcher with HOB elevated.  AAOx4. Pain, potty, position needs addressed. RR even and unlabored, NAD noted. SRx2 up, bed in lowest position, call light within reach.  Will continue to monitor

## 2019-04-12 NOTE — NURSING
Pt arrived to floor via stretcher per transport,  IVF started, SCDs applied, oriented to room, call light placed within reach, bed low and locked, and family at bedside.No complaints of pain. No acute distress noted at this time. Will continue to monitor.

## 2019-04-13 LAB
ALBUMIN SERPL BCP-MCNC: 2.4 G/DL (ref 3.5–5.2)
ALP SERPL-CCNC: 98 U/L (ref 55–135)
ALT SERPL W/O P-5'-P-CCNC: 69 U/L (ref 10–44)
ANION GAP SERPL CALC-SCNC: 8 MMOL/L (ref 8–16)
ANION GAP SERPL CALC-SCNC: 9 MMOL/L (ref 8–16)
AST SERPL-CCNC: 63 U/L (ref 10–40)
BILIRUB SERPL-MCNC: 0.9 MG/DL (ref 0.1–1)
BUN SERPL-MCNC: 11 MG/DL (ref 6–20)
BUN SERPL-MCNC: 19 MG/DL (ref 6–20)
CALCIUM SERPL-MCNC: 8.1 MG/DL (ref 8.7–10.5)
CALCIUM SERPL-MCNC: 8.4 MG/DL (ref 8.7–10.5)
CHLORIDE SERPL-SCNC: 93 MMOL/L (ref 95–110)
CHLORIDE SERPL-SCNC: 95 MMOL/L (ref 95–110)
CHLORIDE UR-SCNC: <20 MMOL/L (ref 25–200)
CO2 SERPL-SCNC: 24 MMOL/L (ref 23–29)
CO2 SERPL-SCNC: 25 MMOL/L (ref 23–29)
CREAT SERPL-MCNC: 0.9 MG/DL (ref 0.5–1.4)
CREAT SERPL-MCNC: 1.1 MG/DL (ref 0.5–1.4)
EST. GFR  (AFRICAN AMERICAN): >60 ML/MIN/1.73 M^2
EST. GFR  (AFRICAN AMERICAN): >60 ML/MIN/1.73 M^2
EST. GFR  (NON AFRICAN AMERICAN): >60 ML/MIN/1.73 M^2
EST. GFR  (NON AFRICAN AMERICAN): >60 ML/MIN/1.73 M^2
GLUCOSE SERPL-MCNC: 154 MG/DL (ref 70–110)
GLUCOSE SERPL-MCNC: 98 MG/DL (ref 70–110)
MAGNESIUM SERPL-MCNC: 2.2 MG/DL (ref 1.6–2.6)
OSMOLALITY UR: 230 MOSM/KG (ref 50–1200)
PHOSPHATE SERPL-MCNC: 2.9 MG/DL (ref 2.7–4.5)
POCT GLUCOSE: 201 MG/DL (ref 70–110)
POTASSIUM SERPL-SCNC: 3.8 MMOL/L (ref 3.5–5.1)
POTASSIUM SERPL-SCNC: 4.1 MMOL/L (ref 3.5–5.1)
PROT SERPL-MCNC: 6 G/DL (ref 6–8.4)
SODIUM SERPL-SCNC: 126 MMOL/L (ref 136–145)
SODIUM SERPL-SCNC: 128 MMOL/L (ref 136–145)
SODIUM UR-SCNC: <20 MMOL/L (ref 20–250)

## 2019-04-13 PROCEDURE — 82436 ASSAY OF URINE CHLORIDE: CPT

## 2019-04-13 PROCEDURE — 84100 ASSAY OF PHOSPHORUS: CPT

## 2019-04-13 PROCEDURE — 25000003 PHARM REV CODE 250: Performed by: HOSPITALIST

## 2019-04-13 PROCEDURE — 94761 N-INVAS EAR/PLS OXIMETRY MLT: CPT

## 2019-04-13 PROCEDURE — 80053 COMPREHEN METABOLIC PANEL: CPT

## 2019-04-13 PROCEDURE — 63600175 PHARM REV CODE 636 W HCPCS: Performed by: HOSPITALIST

## 2019-04-13 PROCEDURE — 83735 ASSAY OF MAGNESIUM: CPT

## 2019-04-13 PROCEDURE — 84300 ASSAY OF URINE SODIUM: CPT

## 2019-04-13 PROCEDURE — 99233 SBSQ HOSP IP/OBS HIGH 50: CPT | Mod: ,,, | Performed by: HOSPITALIST

## 2019-04-13 PROCEDURE — 36415 COLL VENOUS BLD VENIPUNCTURE: CPT

## 2019-04-13 PROCEDURE — 11000001 HC ACUTE MED/SURG PRIVATE ROOM

## 2019-04-13 PROCEDURE — 80048 BASIC METABOLIC PNL TOTAL CA: CPT

## 2019-04-13 PROCEDURE — 99233 PR SUBSEQUENT HOSPITAL CARE,LEVL III: ICD-10-PCS | Mod: ,,, | Performed by: HOSPITALIST

## 2019-04-13 RX ADMIN — HALOPERIDOL 10 MG: 5 TABLET ORAL at 09:04

## 2019-04-13 RX ADMIN — BENZTROPINE MESYLATE 1 MG: 1 TABLET ORAL at 09:04

## 2019-04-13 RX ADMIN — SODIUM CHLORIDE, SODIUM LACTATE, POTASSIUM CHLORIDE, AND CALCIUM CHLORIDE: 600; 310; 30; 20 INJECTION, SOLUTION INTRAVENOUS at 09:04

## 2019-04-13 RX ADMIN — SODIUM CHLORIDE, SODIUM LACTATE, POTASSIUM CHLORIDE, AND CALCIUM CHLORIDE: 600; 310; 30; 20 INJECTION, SOLUTION INTRAVENOUS at 01:04

## 2019-04-13 RX ADMIN — CEFTRIAXONE 1 G: 1 INJECTION, SOLUTION INTRAVENOUS at 12:04

## 2019-04-13 RX ADMIN — SODIUM CHLORIDE, SODIUM LACTATE, POTASSIUM CHLORIDE, AND CALCIUM CHLORIDE: 600; 310; 30; 20 INJECTION, SOLUTION INTRAVENOUS at 06:04

## 2019-04-13 NOTE — SUBJECTIVE & OBJECTIVE
Interval History:  He feels better, has been eating more and no diarrhea since admission.  Remains on IV fluids.  Sodium level a little higher today.  Discussed with patient's sister.    Review of Systems   Constitutional: Negative for chills and fever.   Respiratory: Negative for cough and shortness of breath.    Cardiovascular: Negative for chest pain and palpitations.     Objective:     Vital Signs (Most Recent):  Temp: 98.5 °F (36.9 °C) (04/13/19 0709)  Pulse: 80 (04/13/19 0932)  Resp: 18 (04/13/19 0932)  BP: (!) 93/55 (04/13/19 0709)  SpO2: 100 % (04/13/19 0932) Vital Signs (24h Range):  Temp:  [98.1 °F (36.7 °C)-99.4 °F (37.4 °C)] 98.5 °F (36.9 °C)  Pulse:  [72-99] 80  Resp:  [18] 18  SpO2:  [99 %-100 %] 100 %  BP: ()/(55-59) 93/55     Weight: 63.9 kg (140 lb 14 oz)  Body mass index is 20.8 kg/m².    Intake/Output Summary (Last 24 hours) at 4/13/2019 1606  Last data filed at 4/13/2019 0933  Gross per 24 hour   Intake 960 ml   Output 200 ml   Net 760 ml      Physical Exam   Constitutional: He is oriented to person, place, and time. He appears well-developed and well-nourished. No distress.   HENT:   Head: Normocephalic.   Edentulous   Eyes: Pupils are equal, round, and reactive to light. Conjunctivae are normal.   Neck: Neck supple. No thyromegaly present.   Cardiovascular: Normal rate, regular rhythm and normal heart sounds. Exam reveals no gallop and no friction rub.   No murmur heard.  Pulses:       Dorsalis pedis pulses are 1+ on the right side, and 1+ on the left side.   Pulmonary/Chest: Effort normal and breath sounds normal.   Abdominal: Soft. Bowel sounds are normal. He exhibits no distension. There is no tenderness.   Musculoskeletal: Normal range of motion. He exhibits no edema.   Lymphadenopathy:     He has no cervical adenopathy.   Neurological: He is alert and oriented to person, place, and time.   Strength equal and symmetric   Skin: Skin is warm and dry. No rash noted.   Multiple verrucous  lesions in beard distribution of face.  Skin of lower legs dry and flaking.   Psychiatric: He has a normal mood and affect. His behavior is normal. Thought content normal.       Significant Labs:   BMP:   Recent Labs   Lab 04/13/19  0500   GLU 98   *   K 4.1   CL 93*   CO2 24   BUN 19   CREATININE 1.1   CALCIUM 8.4*   MG 2.2     CBC:   Recent Labs   Lab 04/12/19  1053   WBC 5.93   HGB 9.1*   HCT 26.1*          Significant Imaging: I have reviewed all pertinent imaging results/findings within the past 24 hours.

## 2019-04-13 NOTE — HOSPITAL COURSE
Patient was admitted on IV fluids for hypovolemic hyponatremia, and sodium level was monitored.  The cause of the low sodium level was thought to be multifactorial with both GI fluid losses and haloperidol use as contributors.  His sodium level gradually improved with fluid replacement.  In addition he was treated for a UTI with ceftriaxone and completed 3 doses prior to discharge.  He was not symptomatic (no fever or dysuria).  At discharge he was feeling well and sodium level had improved to 131.

## 2019-04-13 NOTE — PROGRESS NOTES
[facility] Hospital Medicine  Progress Note    Patient Name: Macario William  MRN: [id number]  Patient Class: IP- Inpatient   Admission Date: 4/12/2019  Length of Stay: 1 days  Attending Physician: Jessica Ferrera MD  Primary Care Provider: [facility]        Subjective:     Principal Problem:Hyponatremia    HPI:  Mr. William is a 58 year old man who presented with decreased appetite and diarrhea for more than a week associated with a bad taste in his mouth.  He quit smoking and stopped eating as his food didn't taste right.  He was seen in the ED 4 weeks ago for LLE pain and had an ultrasound done that ruled out a DVT.  He was discharged on ibuprofen and Robaxin but has not been taking either, and he stopped taking his Haldol and Cogentin too.  Labs in ED were notable for sodium 123 with last sodium from the ED visit last month 133.  AST/ALT were up to 86/85 from normal.  Clean catch UA was done and showed a low specific gravity of <1.005, 1+ blood, positive nitrite, >100 WBC with clumps and many bacteria.  He was hypotensive with BP in the 80's on presentation.  He was given IV fluids, a dose of ceftriaxone and admitted for management of hyponatremia.    Medical history includes chronic schizophrenia for which he is on Haldol, Cogentin and Benadryl.  He denies other medical problems.  As noted above he quit smoking a couple of weeks ago.  He is disabled on SSI.  He has had verrucous lesions of the face that appear to be filiform warts for several years and has been referred to dermatology at [facility] but has not followed up.    Hospital Course:  Patient was admitted on IV fluids for hypovolemic hyponatremia, and sodium level was monitored.  The cause of the low sodium level was thought to be multifactorial with both GI fluid losses and haloperidol use as contributors.  His sodium level gradually improved with fluid replacement.  In addition he was treated for a UTI with  ceftriaxone.    Interval History:  He feels better, has been eating more and no diarrhea since admission.  Remains on IV fluids.  Sodium level a little higher today.  Discussed with patient's sister.    Review of Systems   Constitutional: Negative for chills and fever.   Respiratory: Negative for cough and shortness of breath.    Cardiovascular: Negative for chest pain and palpitations.     Objective:     Vital Signs (Most Recent):  Temp: 98.5 °F (36.9 °C) (04/13/19 0709)  Pulse: 80 (04/13/19 0932)  Resp: 18 (04/13/19 0932)  BP: (!) 93/55 (04/13/19 0709)  SpO2: 100 % (04/13/19 0932) Vital Signs (24h Range):  Temp:  [98.1 °F (36.7 °C)-99.4 °F (37.4 °C)] 98.5 °F (36.9 °C)  Pulse:  [72-99] 80  Resp:  [18] 18  SpO2:  [99 %-100 %] 100 %  BP: ()/(55-59) 93/55     Weight: 63.9 kg (140 lb 14 oz)  Body mass index is 20.8 kg/m².    Intake/Output Summary (Last 24 hours) at 4/13/2019 1606  Last data filed at 4/13/2019 0933  Gross per 24 hour   Intake 960 ml   Output 200 ml   Net 760 ml      Physical Exam   Constitutional: He is oriented to person, place, and time. He appears well-developed and well-nourished. No distress.   HENT:   Head: Normocephalic.   Edentulous   Eyes: Pupils are equal, round, and reactive to light. Conjunctivae are normal.   Neck: Neck supple. No thyromegaly present.   Cardiovascular: Normal rate, regular rhythm and normal heart sounds. Exam reveals no gallop and no friction rub.   No murmur heard.  Pulses:       Dorsalis pedis pulses are 1+ on the right side, and 1+ on the left side.   Pulmonary/Chest: Effort normal and breath sounds normal.   Abdominal: Soft. Bowel sounds are normal. He exhibits no distension. There is no tenderness.   Musculoskeletal: Normal range of motion. He exhibits no edema.   Lymphadenopathy:     He has no cervical adenopathy.   Neurological: He is alert and oriented to person, place, and time.   Strength equal and symmetric   Skin: Skin is warm and dry. No rash noted.  "  Multiple verrucous lesions in beard distribution of face.  Skin of lower legs dry and flaking.   Psychiatric: He has a normal mood and affect. His behavior is normal. Thought content normal.       Significant Labs:   BMP:   Recent Labs   Lab 04/13/19  0500   GLU 98   *   K 4.1   CL 93*   CO2 24   BUN 19   CREATININE 1.1   CALCIUM 8.4*   MG 2.2     CBC:   Recent Labs   Lab 04/12/19  1053   WBC 5.93   HGB 9.1*   HCT 26.1*          Significant Imaging: I have reviewed all pertinent imaging results/findings within the past 24 hours.    Assessment/Plan:      * Hyponatremia  - Low at baseline but lower than when checked last month when he presented for leg pain.  Patient hypotensive on presentation  - Urine and serum osmolality low with low urine sodium.  Indicates hypovolemic hyponatremia for which fluids are indicated.  - He also reports he drinks a lot of water.  Might be dilutional and fluid restriction would be helpful.  - Might also be side effect of Haldol.      Hypotension due to hypovolemia  - As above under "hyponatremia."      Schizophrenia  - Probably chronic paranoid schizophrenia.  - Continue Haldol and Cogentin.      Filiform warts  - Has had this for years.  Follow up with [facility] dermatology as previously recommended        VTE Risk Mitigation (From admission, onward)        Ordered     Place sequential compression device  Until discontinued      04/12/19 1600     IP VTE LOW RISK PATIENT  Once      04/12/19 1600     Place sequential compression device  Until discontinued      04/12/19 1600              Jessica Moe MD  Department of Hospital Medicine   [facility]  "

## 2019-04-13 NOTE — PLAN OF CARE
Problem: Adult Inpatient Plan of Care  Goal: Plan of Care Review  Outcome: Ongoing (interventions implemented as appropriate)  VSS and afebrile, aaox4. LR at 125. IV site WNL. Neurovascular checks WNL. Voiding to urinal. Up ad abdoul. Able to make needs known. Critical value communicated to provider. Regular diet, tolerating well. Plan of care reviewed with patient. Purposeful hourly rounding done. Call light at bedside, bed at lowest position, brakes on, non skid socks on. Will continue to monitor.

## 2019-04-13 NOTE — NURSING
Critical value communicated from eduardo at lab at [facility]. Serum osmo of 257. Value communicated to SHOSHANA Navarro. No further orders. Will continue to monitor.

## 2019-04-13 NOTE — ASSESSMENT & PLAN NOTE
- Has had this for years.  Follow up with [facility] dermatology as previously recommended

## 2019-04-14 VITALS
WEIGHT: 140.88 LBS | SYSTOLIC BLOOD PRESSURE: 117 MMHG | HEART RATE: 64 BPM | TEMPERATURE: 99 F | RESPIRATION RATE: 18 BRPM | BODY MASS INDEX: 20.87 KG/M2 | OXYGEN SATURATION: 100 % | DIASTOLIC BLOOD PRESSURE: 56 MMHG | HEIGHT: 69 IN

## 2019-04-14 LAB
ALBUMIN SERPL BCP-MCNC: 2.1 G/DL (ref 3.5–5.2)
ALP SERPL-CCNC: 86 U/L (ref 55–135)
ALT SERPL W/O P-5'-P-CCNC: 54 U/L (ref 10–44)
ANION GAP SERPL CALC-SCNC: 8 MMOL/L (ref 8–16)
AST SERPL-CCNC: 39 U/L (ref 10–40)
BILIRUB SERPL-MCNC: 0.4 MG/DL (ref 0.1–1)
BUN SERPL-MCNC: 7 MG/DL (ref 6–20)
CALCIUM SERPL-MCNC: 8.2 MG/DL (ref 8.7–10.5)
CHLORIDE SERPL-SCNC: 99 MMOL/L (ref 95–110)
CO2 SERPL-SCNC: 24 MMOL/L (ref 23–29)
CREAT SERPL-MCNC: 0.9 MG/DL (ref 0.5–1.4)
EST. GFR  (AFRICAN AMERICAN): >60 ML/MIN/1.73 M^2
EST. GFR  (NON AFRICAN AMERICAN): >60 ML/MIN/1.73 M^2
GLUCOSE SERPL-MCNC: 140 MG/DL (ref 70–110)
MAGNESIUM SERPL-MCNC: 1.9 MG/DL (ref 1.6–2.6)
PHOSPHATE SERPL-MCNC: 2.6 MG/DL (ref 2.7–4.5)
POTASSIUM SERPL-SCNC: 4 MMOL/L (ref 3.5–5.1)
PROT SERPL-MCNC: 5.2 G/DL (ref 6–8.4)
SODIUM SERPL-SCNC: 131 MMOL/L (ref 136–145)

## 2019-04-14 PROCEDURE — 80053 COMPREHEN METABOLIC PANEL: CPT

## 2019-04-14 PROCEDURE — 83735 ASSAY OF MAGNESIUM: CPT

## 2019-04-14 PROCEDURE — 25000003 PHARM REV CODE 250: Performed by: HOSPITALIST

## 2019-04-14 PROCEDURE — 84100 ASSAY OF PHOSPHORUS: CPT

## 2019-04-14 PROCEDURE — 36415 COLL VENOUS BLD VENIPUNCTURE: CPT

## 2019-04-14 PROCEDURE — 99238 HOSP IP/OBS DSCHRG MGMT 30/<: CPT | Mod: ,,, | Performed by: HOSPITALIST

## 2019-04-14 PROCEDURE — 99238 PR HOSPITAL DISCHARGE DAY,<30 MIN: ICD-10-PCS | Mod: ,,, | Performed by: HOSPITALIST

## 2019-04-14 PROCEDURE — 63600175 PHARM REV CODE 636 W HCPCS: Performed by: HOSPITALIST

## 2019-04-14 RX ADMIN — CEFTRIAXONE 1 G: 1 INJECTION, SOLUTION INTRAVENOUS at 12:04

## 2019-04-14 RX ADMIN — BENZTROPINE MESYLATE 1 MG: 1 TABLET ORAL at 09:04

## 2019-04-14 RX ADMIN — HALOPERIDOL 10 MG: 5 TABLET ORAL at 09:04

## 2019-04-15 LAB — BACTERIA UR CULT: NORMAL

## 2019-04-15 NOTE — DISCHARGE SUMMARY
[facility]  Hospital Medicine  Discharge Summary      Patient Name: Macario William  MRN: [id number]  Admission Date: 4/12/2019  Hospital Length of Stay: 2 days  Discharge Date and Time: 4/14/2019 12:45 PM  Attending Physician: No att. providers found   Discharging Provider: Jessica Moe MD  Primary Care Provider: [facility]      HPI:   Mr. William is a 58 year old man who presented with decreased appetite and diarrhea for more than a week associated with a bad taste in his mouth.  He quit smoking and stopped eating as his food didn't taste right.  He was seen in the ED 4 weeks ago for LLE pain and had an ultrasound done that ruled out a DVT.  He was discharged on ibuprofen and Robaxin but has not been taking either, and he stopped taking his Haldol and Cogentin too.  Labs in ED were notable for sodium 123 with last sodium from the ED visit last month 133.  AST/ALT were up to 86/85 from normal.  Clean catch UA was done and showed a low specific gravity of <1.005, 1+ blood, positive nitrite, >100 WBC with clumps and many bacteria.  He was hypotensive with BP in the 80's on presentation.  He was given IV fluids, a dose of ceftriaxone and admitted for management of hyponatremia.    Medical history includes chronic schizophrenia for which he is on Haldol, Cogentin and Benadryl.  He denies other medical problems.  As noted above he quit smoking a couple of weeks ago.  He is disabled on SSI.  He has had verrucous lesions of the face that appear to be filiform warts for several years and has been referred to dermatology at [facility] but has not followed up.          Hospital Course:   Patient was admitted on IV fluids for hypovolemic hyponatremia, and sodium level was monitored.  The cause of the low sodium level was thought to be multifactorial with both GI fluid losses and haloperidol use as contributors.  His sodium level gradually improved with fluid replacement.  In addition he was treated  for a UTI with ceftriaxone and completed 3 doses prior to discharge.  He was not symptomatic (no fever or dysuria).  At discharge he was feeling well and sodium level had improved to 131.     Note Epic was down when patient was discharged and the med rec does not reflect that he received a complete list of medicines to resume that included the haloperidol and benztropine at his usual home doses.    Final Active Diagnoses:    Diagnosis Date Noted POA    PRINCIPAL PROBLEM:  Hyponatremia [E87.1] 04/12/2019 Yes    Hypotension due to hypovolemia [I95.89, E86.1] 04/12/2019 Yes    Filiform warts [B07.9] 04/12/2019 Yes    Schizophrenia [F20.9] 04/12/2019 Yes      Problems Resolved During this Admission:       Discharged Condition: stable    Disposition: Home or Self Care    Follow Up:  [facility]    Medications:  Reconciled Home Medications:      Medication List      ASK your doctor about these medications    benztropine 2 MG Tab  Commonly known as:  COGENTIN  Take 1 mg by mouth 2 (two) times daily.     haloperidol 10 MG tablet  Commonly known as:  HALDOL  Take 10 mg by mouth 2 (two) times daily.            Time spent on the discharge of patient: <30 minutes  Patient was seen and examined on the date of discharge and determined to be suitable for discharge.         Jessica Moe MD  Department of Hospital Medicine  [facility]

## 2020-02-19 ENCOUNTER — HOSPITAL ENCOUNTER (EMERGENCY)
Facility: OTHER | Age: 60
Discharge: HOME | End: 2020-02-19
Attending: EMERGENCY MEDICINE
Payer: MEDICAID

## 2020-02-19 VITALS
RESPIRATION RATE: 18 BRPM | OXYGEN SATURATION: 99 % | HEIGHT: 69 IN | DIASTOLIC BLOOD PRESSURE: 90 MMHG | TEMPERATURE: 99 F | HEART RATE: 74 BPM | BODY MASS INDEX: 22.22 KG/M2 | SYSTOLIC BLOOD PRESSURE: 140 MMHG | WEIGHT: 150 LBS

## 2020-02-19 DIAGNOSIS — R52 PAIN: ICD-10-CM

## 2020-02-19 DIAGNOSIS — M79.605 LEFT LEG PAIN: Primary | ICD-10-CM

## 2020-02-19 PROCEDURE — 99283 EMERGENCY DEPT VISIT LOW MDM: CPT | Mod: 25

## 2020-02-19 RX ORDER — BENZTROPINE MESYLATE 2 MG/1
1 TABLET ORAL
COMMUNITY
End: 2023-09-12 | Stop reason: SDUPTHER

## 2020-02-19 RX ORDER — LEVOTHYROXINE SODIUM 112 UG/1
TABLET ORAL
COMMUNITY
Start: 2020-02-09

## 2020-02-19 RX ORDER — MELOXICAM 7.5 MG/1
TABLET ORAL
COMMUNITY
Start: 2020-02-09

## 2020-02-19 NOTE — ED PROVIDER NOTES
"Encounter Date: 2/19/2020    SCRIBE #1 NOTE: I, Larissa Felipe, am scribing for, and in the presence of, Dr. Ludwig .       History     Chief Complaint   Patient presents with    Leg Pain     pt with c/o leg pain x one mth      Time seen by provider: 8:39 AM    This is a 59 y.o. male with a hx of left leg pain for 2 years and multiple ED visits who presents with complaint of left hip pain that radiates all the way down his leg since 1 month ago. He reports that the pain is worse with standing and at night. He denies seeing his PCP or Orthopedics. He mentions that he was seen here in the past and was given a "US of the area".  Patient states that his pain is unchanged, but he is concerned that it has lasted this long.  No weakness, no paresthesias      The history is provided by the patient.     Review of patient's allergies indicates:  No Known Allergies  Past Medical History:   Diagnosis Date    Schizophrenia      Past Surgical History:   Procedure Laterality Date    CLAVICLE SURGERY Right 1976     Family History   Problem Relation Age of Onset    Cancer Mother         Unknown type     Social History     Tobacco Use    Smoking status: Former Smoker     Types: Cigarettes    Smokeless tobacco: Never Used    Tobacco comment: Reports he quit around 4/1/19   Substance Use Topics    Alcohol use: No    Drug use: No     Review of Systems   Constitutional: Negative for fever.   HENT: Negative for sore throat.    Respiratory: Negative for shortness of breath.    Cardiovascular: Negative for chest pain.   Gastrointestinal: Negative for nausea.   Genitourinary: Negative for dysuria.   Musculoskeletal: Negative for back pain.        Positive for leg pain.    Skin: Negative for rash.   Neurological: Negative for weakness.   Hematological: Does not bruise/bleed easily.   All other systems reviewed and are negative.      Physical Exam     Initial Vitals [02/19/20 0759]   BP Pulse Resp Temp SpO2   (!) 145/105 92 18 98.6 °F " (37 °C) 100 %      MAP       --         Physical Exam    Nursing note and vitals reviewed.  Constitutional: He appears well-developed and well-nourished. He is not diaphoretic. No distress.   HENT:   Head: Normocephalic and atraumatic.   Nose: Nose normal.   Eyes: Conjunctivae and EOM are normal. Pupils are equal, round, and reactive to light.   Neck: Normal range of motion. Neck supple. No JVD present.   Cardiovascular: Normal rate, regular rhythm, normal heart sounds and intact distal pulses. Exam reveals no gallop and no friction rub.    No murmur heard.  Pulmonary/Chest: Breath sounds normal. No respiratory distress. He has no wheezes. He has no rhonchi. He has no rales. He exhibits no tenderness.   Musculoskeletal:   LLE: Positive tenderness to great trochanter. Positive LaPorte's test.  No pain with internal external rotation at hip, normally ankle and foot exam.  NVID.    Neurological: He is alert and oriented to person, place, and time. He has normal strength. No cranial nerve deficit.   Skin: Skin is warm. Capillary refill takes less than 2 seconds.   Psychiatric: He has a normal mood and affect. Thought content normal.         ED Course   Procedures  Labs Reviewed - No data to display       Imaging Results    None       X-Rays:   Independently Interpreted Readings:   Other Readings:  No fracture. No dislocation. No foreign body.     Medical Decision Making:   History:   Old Medical Records: I decided to obtain old medical records.  Differential Diagnosis:   Fracture, dislocation, ligamentous injury, tenderness injury, neurovascular injury, muscular tear, rhabdomyolysis, compartment syndrome, peripheral vascular disease, gout, arthritis, avascular necrosis, pyomyositis, myositis    Independently Interpreted Test(s):   I have ordered and independently interpreted X-rays - see prior notes.  Clinical Tests:   Radiological Study: Ordered and Reviewed  ED Management:  Discussed with patient that he recently got  a Mobic prescription, urged to continue to take it.  Emphasized to him the need to follow up with his PCP for further management. After taking into careful account the patient's historical factors, physical exam findings, empirical and objective data obtained from ED workup, the patient appears to be low risk for an emergent medical condition. I feel it is safe and appropriate at this time for the patient to be discharged for follow up and re-evaluation as detailed in the discharge instructions. The patient improved with treatment in the ED and the patient/guardian is comfortable going home. I have discussed the specifics of the workup with the patient/guardian and the patient/guardian has verbalized understanding of the details of the workup, the diagnosis, the treatment plan, and the need for outpatient follow-up.  Although the patient has no emergent etiology today this does not preclude the development of an emergent condition after discharge.  I educated the patient/guardian on the warning signs and symptoms for which they must seek immediate medical attention. I have advised the patient/guardian that they can return to the ED and/or activate EMS at any time with worsening of their symptoms, change of their symptoms, or with any other medical complaints.  Patient's/guardian understands the ED visit today was primarily to address immediate concerns and to rule out emergent causes of the symptoms. They also understand that these symptoms may require further workup and evaluation as an outpatient. I emphasized the importance of followup.  All questions addressed and patient/guardian were given discharge instructions and followup information.               Scribe Attestation:   Scribe #1: I performed the above scribed service and the documentation accurately describes the services I performed. I attest to the accuracy of the note.    Attending Attestation:           Physician Attestation for Scribe:  Physician  Attestation Statement for Scribe #1: I, Dr. Ludwig, reviewed documentation, as scribed by Larissa Felipe  in my presence, and it is both accurate and complete.                               Clinical Impression:     1. Left leg pain    2. Pain                                Juanjose Ludwig MD  02/19/20 1048

## 2020-02-19 NOTE — ED NOTES
Pt to er with c/o left leg pain from hip to foot x one mth. Pt states pain and swelling to left  Exterior calf area. Pt seen before for this without relief. . Pt aaox3 skin warm and dry  . Ambulated to room without difficulty. Mild swelling to left outer calf noted.

## 2021-07-19 ENCOUNTER — HOSPITAL ENCOUNTER (EMERGENCY)
Facility: OTHER | Age: 61
Discharge: HOME | End: 2021-07-20
Attending: EMERGENCY MEDICINE
Payer: MEDICAID

## 2021-07-19 DIAGNOSIS — M25.559 HIP PAIN: ICD-10-CM

## 2021-07-19 DIAGNOSIS — S70.00XA CONTUSION OF HIP REGION: Primary | ICD-10-CM
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
## 2021-07-19 PROCEDURE — 25000003 PHARM REV CODE 250: Performed by: EMERGENCY MEDICINE

## 2021-07-19 PROCEDURE — 99283 EMERGENCY DEPT VISIT LOW MDM: CPT

## 2021-07-19 RX ORDER — IBUPROFEN 600 MG/1
600 TABLET ORAL
Status: COMPLETED | OUTPATIENT
Start: 2021-07-19 | End: 2021-07-19

## 2021-07-19 RX ADMIN — IBUPROFEN 600 MG: 600 TABLET, FILM COATED ORAL at 11:07

## 2021-07-20 VITALS
DIASTOLIC BLOOD PRESSURE: 78 MMHG | WEIGHT: 138 LBS | TEMPERATURE: 99 F | RESPIRATION RATE: 16 BRPM | HEART RATE: 87 BPM | OXYGEN SATURATION: 99 % | HEIGHT: 69 IN | SYSTOLIC BLOOD PRESSURE: 135 MMHG | BODY MASS INDEX: 20.44 KG/M2

## 2021-07-20 RX ORDER — IBUPROFEN 600 MG/1
600 TABLET ORAL EVERY 8 HOURS PRN
Qty: 20 TABLET | Refills: 0 | Status: SHIPPED | OUTPATIENT
Start: 2021-07-20 | End: 2022-12-10 | Stop reason: SDUPTHER

## 2021-10-12 ENCOUNTER — HOSPITAL ENCOUNTER (EMERGENCY)
Facility: OTHER | Age: 61
Discharge: HOME | End: 2021-10-12
Attending: EMERGENCY MEDICINE
Payer: MEDICAID

## 2021-10-12 VITALS
HEART RATE: 52 BPM | RESPIRATION RATE: 17 BRPM | SYSTOLIC BLOOD PRESSURE: 126 MMHG | TEMPERATURE: 98 F | DIASTOLIC BLOOD PRESSURE: 62 MMHG | OXYGEN SATURATION: 97 %

## 2021-10-12 DIAGNOSIS — M25.551 RIGHT HIP PAIN: ICD-10-CM

## 2021-10-12 DIAGNOSIS — M54.31 RIGHT SIDED SCIATICA: Primary | ICD-10-CM

## 2021-10-12 PROCEDURE — 99284 PR EMERGENCY DEPT VISIT,LEVEL IV: ICD-10-PCS | Mod: ,,, | Performed by: NURSE PRACTITIONER

## 2021-10-12 PROCEDURE — 99284 EMERGENCY DEPT VISIT MOD MDM: CPT | Mod: ,,, | Performed by: NURSE PRACTITIONER

## 2021-10-12 PROCEDURE — 25000003 PHARM REV CODE 250: Performed by: NURSE PRACTITIONER

## 2021-10-12 PROCEDURE — 99283 EMERGENCY DEPT VISIT LOW MDM: CPT

## 2021-10-12 RX ORDER — METHOCARBAMOL 500 MG/1
500 TABLET, FILM COATED ORAL
Status: COMPLETED | OUTPATIENT
Start: 2021-10-12 | End: 2021-10-12

## 2021-10-12 RX ORDER — METHOCARBAMOL 500 MG/1
500 TABLET, FILM COATED ORAL 3 TIMES DAILY PRN
Qty: 30 TABLET | Refills: 0 | Status: SHIPPED | OUTPATIENT
Start: 2021-10-12 | End: 2021-10-22

## 2021-10-12 RX ORDER — ACETAMINOPHEN 500 MG
1000 TABLET ORAL
Status: COMPLETED | OUTPATIENT
Start: 2021-10-12 | End: 2021-10-12

## 2021-10-12 RX ADMIN — METHOCARBAMOL 500 MG: 500 TABLET ORAL at 10:10

## 2021-10-12 RX ADMIN — ACETAMINOPHEN 1000 MG: 500 TABLET ORAL at 10:10

## 2022-12-10 ENCOUNTER — HOSPITAL ENCOUNTER (EMERGENCY)
Facility: OTHER | Age: 62
Discharge: HOME | End: 2022-12-10
Attending: EMERGENCY MEDICINE
Payer: MEDICAID

## 2022-12-10 VITALS
RESPIRATION RATE: 18 BRPM | HEIGHT: 69 IN | DIASTOLIC BLOOD PRESSURE: 84 MMHG | BODY MASS INDEX: 20.59 KG/M2 | OXYGEN SATURATION: 100 % | TEMPERATURE: 98 F | HEART RATE: 74 BPM | WEIGHT: 139 LBS | SYSTOLIC BLOOD PRESSURE: 134 MMHG

## 2022-12-10 DIAGNOSIS — R68.84 CHRONIC JAW PAIN: Primary | ICD-10-CM

## 2022-12-10 DIAGNOSIS — G89.29 CHRONIC JAW PAIN: Primary | ICD-10-CM

## 2022-12-10 PROCEDURE — 99284 EMERGENCY DEPT VISIT MOD MDM: CPT

## 2022-12-10 PROCEDURE — 96372 THER/PROPH/DIAG INJ SC/IM: CPT | Performed by: EMERGENCY MEDICINE

## 2022-12-10 PROCEDURE — 63600175 PHARM REV CODE 636 W HCPCS: Performed by: EMERGENCY MEDICINE

## 2022-12-10 RX ORDER — KETOROLAC TROMETHAMINE 30 MG/ML
30 INJECTION, SOLUTION INTRAMUSCULAR; INTRAVENOUS
Status: COMPLETED | OUTPATIENT
Start: 2022-12-10 | End: 2022-12-10

## 2022-12-10 RX ORDER — IBUPROFEN 600 MG/1
600 TABLET ORAL EVERY 8 HOURS PRN
Qty: 20 TABLET | Refills: 0 | Status: SHIPPED | OUTPATIENT
Start: 2022-12-10

## 2022-12-10 RX ADMIN — KETOROLAC TROMETHAMINE 30 MG: 30 INJECTION, SOLUTION INTRAMUSCULAR; INTRAVENOUS at 08:12

## 2022-12-10 NOTE — ED TRIAGE NOTES
"Pt presents to the ED w/ c/o L sided jaw pain. Pt states "I fell 1 year ago on my chin and was told I have TMJ; the pain now radiates to my jaw but I have no teeth."   "
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
## 2023-01-07 ENCOUNTER — HOSPITAL ENCOUNTER (EMERGENCY)
Facility: OTHER | Age: 63
Discharge: HOME | End: 2023-01-08
Attending: EMERGENCY MEDICINE
Payer: MEDICAID

## 2023-01-07 VITALS
WEIGHT: 151 LBS | DIASTOLIC BLOOD PRESSURE: 80 MMHG | TEMPERATURE: 98 F | BODY MASS INDEX: 22.3 KG/M2 | OXYGEN SATURATION: 99 % | HEART RATE: 70 BPM | RESPIRATION RATE: 18 BRPM | SYSTOLIC BLOOD PRESSURE: 166 MMHG

## 2023-01-07 DIAGNOSIS — S16.1XXA CERVICAL STRAIN, ACUTE, INITIAL ENCOUNTER: Primary | ICD-10-CM

## 2023-01-07 DIAGNOSIS — R52 PAIN: ICD-10-CM

## 2023-01-07 PROCEDURE — 25000003 PHARM REV CODE 250: Performed by: PHYSICIAN ASSISTANT

## 2023-01-07 PROCEDURE — 63600175 PHARM REV CODE 636 W HCPCS: Performed by: PHYSICIAN ASSISTANT

## 2023-01-07 PROCEDURE — 99284 EMERGENCY DEPT VISIT MOD MDM: CPT

## 2023-01-07 PROCEDURE — 96372 THER/PROPH/DIAG INJ SC/IM: CPT | Performed by: PHYSICIAN ASSISTANT

## 2023-01-07 RX ORDER — LIDOCAINE 50 MG/G
1 PATCH TOPICAL
Status: DISCONTINUED | OUTPATIENT
Start: 2023-01-07 | End: 2023-01-07

## 2023-01-07 RX ORDER — LIDOCAINE 50 MG/G
1 PATCH TOPICAL
Status: DISCONTINUED | OUTPATIENT
Start: 2023-01-07 | End: 2023-01-08 | Stop reason: HOSPADM

## 2023-01-07 RX ORDER — METHOCARBAMOL 750 MG/1
1500 TABLET, FILM COATED ORAL ONCE
Status: COMPLETED | OUTPATIENT
Start: 2023-01-07 | End: 2023-01-07

## 2023-01-07 RX ORDER — KETOROLAC TROMETHAMINE 30 MG/ML
10 INJECTION, SOLUTION INTRAMUSCULAR; INTRAVENOUS
Status: COMPLETED | OUTPATIENT
Start: 2023-01-07 | End: 2023-01-07

## 2023-01-07 RX ORDER — LIDOCAINE 50 MG/G
1 PATCH TOPICAL DAILY
Qty: 15 PATCH | Refills: 0 | Status: SHIPPED | OUTPATIENT
Start: 2023-01-07

## 2023-01-07 RX ORDER — METHOCARBAMOL 750 MG/1
1500 TABLET, FILM COATED ORAL 3 TIMES DAILY
Qty: 30 TABLET | Refills: 0 | Status: SHIPPED | OUTPATIENT
Start: 2023-01-07 | End: 2023-01-12

## 2023-01-07 RX ADMIN — METHOCARBAMOL 1500 MG: 750 TABLET ORAL at 09:01

## 2023-01-07 RX ADMIN — LIDOCAINE 1 PATCH: 50 PATCH TOPICAL at 09:01

## 2023-01-07 RX ADMIN — KETOROLAC TROMETHAMINE 10 MG: 30 INJECTION, SOLUTION INTRAMUSCULAR; INTRAVENOUS at 09:01

## 2023-01-08 NOTE — ED PROVIDER NOTES
"  Source of History:  Patient     Chief complaint:  Neck Pain (Left sided neck pain without radiation X 3 days. Denies injury/trauma to area, only "sleeping wrong". Pt speech slightly slow and slurred during triage, states that is his baseline.)      HPI:  Macario William is a 62 y.o. male presenting with  left-sided neck pain.  Patient reports that pain began 3 days ago upon waking.  States it is located to the left side it is exacerbated by certain movements and palpation.  He denies any radiation, numbness, tingling, chest pain or shortness of breath.  He has tried his regular medications with no improvement symptoms.      This is the extent to the patients complaints today here in the emergency department.    ROS: As per HPI and below:  Constitutional: No fever.  No chills.  Eyes: No visual changes.   ENT: No sore throat. No ear pain.  Urinary: No abnormal urination.  MSK:  Left-sided neck pain  Integument: No rashes or lesions.    Review of patient's allergies indicates:  No Known Allergies    PMH:  As per HPI and below:  Past Medical History:   Diagnosis Date    Schizophrenia      Past Surgical History:   Procedure Laterality Date    CLAVICLE SURGERY Right 1976       Social History     Tobacco Use    Smoking status: Former     Types: Cigarettes    Smokeless tobacco: Never    Tobacco comments:     Reports he quit around 4/1/19   Substance Use Topics    Alcohol use: No    Drug use: No       Physical Exam:    BP (!) 166/80   Pulse 70   Temp 98 °F (36.7 °C) (Oral)   Resp 18   Wt 68.5 kg (151 lb)   SpO2 99%   BMI 22.30 kg/m²   Nursing note and vital signs reviewed.  Constitutional: No acute distress.  Eyes: No conjunctival injection.  Extraocular muscles are intact.  ENT: Normal phonation.  Musculoskeletal:  No midline spinous tenderness, step-off or obvious bony deformity.  TTP of the left SCM and trapezius muscles with spasm noted.  Full range of motion of extremities with strength equal bilaterally.  Fair " passive range of motion of neck although noted pain on lateral rotation.  Skin: No rashes seen.  Good turgor.  No abrasions.  No ecchymoses.  Psych: Appropriate, conversant.        I decided to obtain the patient's medical records.      Imaging Results              X-Ray Cervical Spine AP And Lateral (Final result)  Result time 01/07/23 22:14:51      Final result by Johnna Baker MD (01/07/23 22:14:51)                   Impression:      No definite evidence of acute fracture.      Electronically signed by: Johnna Baker  Date:    01/07/2023  Time:    22:14               Narrative:    EXAMINATION:  CERVICAL SPINE    CLINICAL HISTORY:  Pain.    TECHNIQUE:  AP and lateral views of the cervical spine submitted    COMPARISON:  None.    FINDINGS:  There is satisfactory alignment of the cervical spine.  There is no acute fracture or subluxation detected. There is no definite evidence of prevertebral soft tissue swelling. The predental space is maintained.  There is a slightly flattened appearance of the vertebral bodies from C3 through C7, which is not acute.  There are marginal osteophytes.                                      MDM:    Macario William  62 y.o. presented to the ED with c/o left neck pain.  Physical exam reveals patient well appearing in no distress exhibiting smooth steady gait to room. No midline spinous tenderness, step-off or obvious bony deformity.  TTP of the left SCM and trapezius muscles with spasm noted.  Full range of motion of extremities with strength equal bilaterally.  Fair passive range of motion of neck although noted pain on lateral rotation.  Lungs clear, heart regular rate and rhythm.    DDX: back spasm, fracture, dislocation, strain    ED management:  Given the patient's age will obtain x-ray of neck although no noted midline spinous tenderness.  Patient did report improvement symptoms in the ED with anti-inflammatory, muscle relaxant and lidocaine patch.  Discussed overall  impression is consistent with cervical strain and will treat with symptomatic medications.  Encouraged to continue his anti-inflammatory and will add additional agents.  We will treat with symptomatic medications for back spasm. Encouraged rest, hot soaks and massage.     Impression/Plan: The primary encounter diagnosis was Cervical strain, acute, initial encounter. A diagnosis of Pain was also pertinent to this visit.Patient will follow up with Primary.  Patient cautioned on when to return to ED.  Pt. Understands and agrees with current treatment plan                   Diagnostic Impression:    1. Pain                 Please pardon typos or dictation errors, as this note was transcribed using M*Modal fluency direct dictation software.      SHIRA Jenkins  01/09/23 5873

## 2023-01-18 ENCOUNTER — HOSPITAL ENCOUNTER (EMERGENCY)
Facility: OTHER | Age: 63
Discharge: HOME | End: 2023-01-18
Attending: EMERGENCY MEDICINE
Payer: MEDICAID

## 2023-01-18 VITALS
TEMPERATURE: 98 F | RESPIRATION RATE: 18 BRPM | DIASTOLIC BLOOD PRESSURE: 83 MMHG | HEART RATE: 90 BPM | SYSTOLIC BLOOD PRESSURE: 147 MMHG | OXYGEN SATURATION: 100 %

## 2023-01-18 DIAGNOSIS — R60.0 HAND EDEMA: ICD-10-CM

## 2023-01-18 DIAGNOSIS — E87.1 HYPONATREMIA: ICD-10-CM

## 2023-01-18 DIAGNOSIS — M54.2 NECK PAIN: Primary | ICD-10-CM

## 2023-01-18 LAB
ALBUMIN SERPL BCP-MCNC: 3.3 G/DL (ref 3.5–5.2)
ALP SERPL-CCNC: 112 U/L (ref 55–135)
ALT SERPL W/O P-5'-P-CCNC: 17 U/L (ref 10–44)
ANION GAP SERPL CALC-SCNC: 7 MMOL/L (ref 8–16)
AST SERPL-CCNC: 26 U/L (ref 10–40)
BASOPHILS # BLD AUTO: 0.07 K/UL (ref 0–0.2)
BASOPHILS NFR BLD: 1.8 % (ref 0–1.9)
BILIRUB SERPL-MCNC: 0.3 MG/DL (ref 0.1–1)
BNP SERPL-MCNC: 29 PG/ML (ref 0–99)
BUN SERPL-MCNC: 11 MG/DL (ref 8–23)
CALCIUM SERPL-MCNC: 8.6 MG/DL (ref 8.7–10.5)
CHLORIDE SERPL-SCNC: 104 MMOL/L (ref 95–110)
CO2 SERPL-SCNC: 21 MMOL/L (ref 23–29)
CREAT SERPL-MCNC: 1.1 MG/DL (ref 0.5–1.4)
CRP SERPL-MCNC: 4.8 MG/L (ref 0–8.2)
DIFFERENTIAL METHOD: ABNORMAL
EOSINOPHIL # BLD AUTO: 0.4 K/UL (ref 0–0.5)
EOSINOPHIL NFR BLD: 9.5 % (ref 0–8)
ERYTHROCYTE [DISTWIDTH] IN BLOOD BY AUTOMATED COUNT: 13.5 % (ref 11.5–14.5)
ERYTHROCYTE [SEDIMENTATION RATE] IN BLOOD: 10 MM/HR (ref 0–10)
EST. GFR  (NO RACE VARIABLE): >60 ML/MIN/1.73 M^2
GLUCOSE SERPL-MCNC: 94 MG/DL (ref 70–110)
HCT VFR BLD AUTO: 31.1 % (ref 40–54)
HGB BLD-MCNC: 10.4 G/DL (ref 14–18)
IMM GRANULOCYTES # BLD AUTO: 0.01 K/UL (ref 0–0.04)
IMM GRANULOCYTES NFR BLD AUTO: 0.3 % (ref 0–0.5)
LYMPHOCYTES # BLD AUTO: 0.5 K/UL (ref 1–4.8)
LYMPHOCYTES NFR BLD: 12.5 % (ref 18–48)
MCH RBC QN AUTO: 29.7 PG (ref 27–31)
MCHC RBC AUTO-ENTMCNC: 33.4 G/DL (ref 32–36)
MCV RBC AUTO: 89 FL (ref 82–98)
MONOCYTES # BLD AUTO: 0.3 K/UL (ref 0.3–1)
MONOCYTES NFR BLD: 7.9 % (ref 4–15)
NEUTROPHILS # BLD AUTO: 2.7 K/UL (ref 1.8–7.7)
NEUTROPHILS NFR BLD: 68 % (ref 38–73)
NRBC BLD-RTO: 0 /100 WBC
PLATELET # BLD AUTO: 304 K/UL (ref 150–450)
PMV BLD AUTO: 9.8 FL (ref 9.2–12.9)
POTASSIUM SERPL-SCNC: 4.2 MMOL/L (ref 3.5–5.1)
PROT SERPL-MCNC: 6.8 G/DL (ref 6–8.4)
RBC # BLD AUTO: 3.5 M/UL (ref 4.6–6.2)
SODIUM SERPL-SCNC: 132 MMOL/L (ref 136–145)
WBC # BLD AUTO: 3.91 K/UL (ref 3.9–12.7)

## 2023-01-18 PROCEDURE — 85025 COMPLETE CBC W/AUTO DIFF WBC: CPT | Performed by: EMERGENCY MEDICINE

## 2023-01-18 PROCEDURE — 99283 EMERGENCY DEPT VISIT LOW MDM: CPT

## 2023-01-18 PROCEDURE — 80053 COMPREHEN METABOLIC PANEL: CPT | Performed by: EMERGENCY MEDICINE

## 2023-01-18 PROCEDURE — 85651 RBC SED RATE NONAUTOMATED: CPT | Performed by: EMERGENCY MEDICINE

## 2023-01-18 PROCEDURE — 83880 ASSAY OF NATRIURETIC PEPTIDE: CPT | Performed by: EMERGENCY MEDICINE

## 2023-01-18 PROCEDURE — 86140 C-REACTIVE PROTEIN: CPT | Performed by: EMERGENCY MEDICINE

## 2023-01-18 PROCEDURE — 25000003 PHARM REV CODE 250: Performed by: EMERGENCY MEDICINE

## 2023-01-18 RX ORDER — MELOXICAM 7.5 MG/1
7.5 TABLET ORAL DAILY
Qty: 10 TABLET | Refills: 0 | Status: SHIPPED | OUTPATIENT
Start: 2023-01-18 | End: 2023-01-28

## 2023-01-18 RX ORDER — ORPHENADRINE CITRATE 100 MG/1
100 TABLET, EXTENDED RELEASE ORAL
Status: COMPLETED | OUTPATIENT
Start: 2023-01-18 | End: 2023-01-18

## 2023-01-18 RX ADMIN — ORPHENADRINE CITRATE 100 MG: 100 TABLET, EXTENDED RELEASE ORAL at 04:01

## 2023-01-18 NOTE — ED PROVIDER NOTES
Encounter Date: 1/18/2023    SCRIBE #1 NOTE: I, Rey Nassar, am scribing for, and in the presence of,  Jesenia Whittaker MD.     History     Chief Complaint   Patient presents with    Hand Pain     Pt reports R hand swelling x2days.  Pt also c/o neck pain, pt denies trauma or injury     Time seen by provider: 4:30 AM    This is a 62 y.o. male who presents with complaint of bilateral hand swelling that he first noticed last night. The patient also complains of left-sided neck pain and pain to his right chest when he turns his head to the right. He denies any recent falls or injuries. He admits to cigarette use. This is the extent of the patient's complaints at this time.    The history is provided by the patient.   Review of patient's allergies indicates:  No Known Allergies  Past Medical History:   Diagnosis Date    Schizophrenia      Past Surgical History:   Procedure Laterality Date    CLAVICLE SURGERY Right 1976     Family History   Problem Relation Age of Onset    Cancer Mother         Unknown type     Social History     Tobacco Use    Smoking status: Former     Types: Cigarettes    Smokeless tobacco: Never    Tobacco comments:     Reports he quit around 4/1/19   Substance Use Topics    Alcohol use: No    Drug use: No     Review of Systems   Constitutional:  Negative for activity change, appetite change, chills, diaphoresis and fever.   HENT:  Negative for congestion, sore throat and trouble swallowing.    Eyes:  Negative for photophobia and visual disturbance.   Respiratory:  Negative for cough, chest tightness and shortness of breath.    Cardiovascular:  Positive for chest pain.   Gastrointestinal:  Negative for abdominal pain, nausea and vomiting.   Endocrine: Negative for polydipsia, polyphagia and polyuria.   Genitourinary:  Negative for difficulty urinating and flank pain.   Musculoskeletal:  Positive for neck pain. Negative for back pain.        +hand swelling   Skin:  Negative for pallor.    Neurological:  Negative for weakness and headaches.   Psychiatric/Behavioral:  Negative for confusion.    All other systems reviewed and are negative.    Physical Exam     Initial Vitals [01/18/23 0420]   BP Pulse Resp Temp SpO2   (!) 147/83 90 18 98.4 °F (36.9 °C) 100 %      MAP       --         Physical Exam    Nursing note and vitals reviewed.  Constitutional: He appears well-developed and well-nourished. He does not have a sickly appearance. No distress.   HENT:   Head: Normocephalic and atraumatic.   Right Ear: External ear normal.   Left Ear: External ear normal.   Eyes: Conjunctivae and lids are normal. Right eye exhibits no discharge. Left eye exhibits no discharge. Right conjunctiva is not injected. Right conjunctiva has no hemorrhage. Left conjunctiva is not injected. Left conjunctiva has no hemorrhage. No scleral icterus.   Neck: Phonation normal. Neck supple. No stridor present. No JVD present.   Neck supple. No JVD.    Normal range of motion.  Cardiovascular:  Normal rate, regular rhythm and normal heart sounds.     Exam reveals no friction rub.       No murmur heard.  Pulses:       Radial pulses are 2+ on the right side and 2+ on the left side.        Dorsalis pedis pulses are 2+ on the right side and 2+ on the left side.   Pulmonary/Chest: Breath sounds normal. No respiratory distress. He has no wheezes. He has no rhonchi. He has no rales.   Abdominal: Abdomen is soft. He exhibits no distension. There is no abdominal tenderness.   Musculoskeletal:         General: Edema present. No tenderness. Normal range of motion.      Cervical back: Normal range of motion and neck supple.      Comments: Minimal edema to dorsum of hands bilaterally. No tenderness. No leg edema.      Neurological: He is alert and oriented to person, place, and time. He has normal strength. GCS eye subscore is 4. GCS verbal subscore is 5. GCS motor subscore is 6.   Neurovascularly intact distally.    Skin: Skin is warm.    Psychiatric: He has a normal mood and affect. His speech is normal and behavior is normal. Judgment and thought content normal. Cognition and memory are normal.       ED Course   Procedures  Labs Reviewed   COMPREHENSIVE METABOLIC PANEL - Abnormal; Notable for the following components:       Result Value    Sodium 132 (*)     CO2 21 (*)     Calcium 8.6 (*)     Albumin 3.3 (*)     Anion Gap 7 (*)     All other components within normal limits   CBC W/ AUTO DIFFERENTIAL - Abnormal; Notable for the following components:    RBC 3.50 (*)     Hemoglobin 10.4 (*)     Hematocrit 31.1 (*)     Lymph # 0.5 (*)     Lymph % 12.5 (*)     Eosinophil % 9.5 (*)     All other components within normal limits   B-TYPE NATRIURETIC PEPTIDE   C-REACTIVE PROTEIN   SEDIMENTATION RATE          Imaging Results    None          Medications   orphenadrine 12 hr tablet 100 mg (100 mg Oral Given 1/18/23 5259)     Medical Decision Making:   History:   Old Medical Records: I decided to obtain old medical records.  Clinical Tests:   Lab Tests: Ordered and Reviewed  Additional MDM:   Comments: 61 y/o male with c/o bilateral hand edema and neck pain.    Neck pain - pain occurs only with movement of neck and is very focal over the muscle.  Do not feel any imaging is necessary at this time.  Norflex was given for pain was counseled supportive care for home.    Hand edema - only trace amount of edema noted to the dorsal aspect of both hands no edema over the joints.  He has full range motion of all joints.  No overlying skin changes.  Doubt cellulitis.  Lab work obtained to evaluate further for renal/hepatic/heart failure.  Labs without significant abnormalities.  CRP and sed rate also ordered for evaluation of possible inflammatory etiology and also normal.  Labs were significant for a sodium of 132 and normocytic anemia.  Since he refused an IV, he was hydrated orally.  Patient referred to primary care for further evaluation of his anemia.  .       Scribe Attestation:   Scribe #1: I performed the above scribed service and the documentation accurately describes the services I performed. I attest to the accuracy of the note.      ED Course as of 01/18/23 2157 Wed Jan 18, 2023   0741 Sed Rate: 10 [SM]   0741 CRP: 4.8 [SM]      ED Course User Index  [SM] Israel Corley DO        Physician Attestation for Scribe: I, Jesenia Whittaker  , reviewed documentation as scribed in my presence, which is both accurate and complete.           Clinical Impression:   Final diagnoses:  [M54.2] Neck pain (Primary)  [R60.0] Hand edema  [E87.1] Hyponatremia        ED Disposition Condition    Discharge Stable          ED Prescriptions       Medication Sig Dispense Start Date End Date Auth. Provider    meloxicam (MOBIC) 7.5 MG tablet Take 1 tablet (7.5 mg total) by mouth once daily. for 10 days 10 tablet 1/18/2023 1/28/2023 Jesenia Whittaker MD          Follow-up Information       Follow up With Specialties Details Why Contact Info    [facility] Schedule an appointment as soon as possible for a visit   [address] [facility] [place] [address]  [phone]      [facility]  Schedule an appointment as soon as possible for a visit   [address] [facility] [place] [address]  [phone]               Jesenia Whittaker MD  01/18/23 2157

## 2023-01-18 NOTE — ED TRIAGE NOTES
Pt to ED via POV with c/o R hand swelling and neck pain x2days.  Pt reports taking medication for s/s with no relief.  Pt denies trauma or injury.    Patient identifiers for Macario William checked and correct   LOC: Patient is awake, alert, and aware of environment with an appropriate affect.  Patient is oriented x4 and speaking appropriately.  APPEARANCE: Patient resting comfortably and in no acute distress.  Patient is clean and well groomed, patient's clothing is properly fastened.  SKIN: The skin is warm and dry.  Patient has normal skin turgor and moist mucus membranes.  Skin is intact: no bruising or breakdown noted.  MUSCULOSKELETAL: Patient is moving all extremities well, no obvious swelling or deformities noted. Pulses intact.  RESPIRATORY: Airway is open and patent.  Respirations are spontaneous, even and unlabored.  Normal effort and rate noted.  CARDIAC: Patient has a normal rate and rhythm.  No peripheral edema noted.  Capillary refill < 3 seconds.  ABDOMEN: No abd distention noted.  Bowel sounds active in all 4 quadrants.  Soft and non-tender upon palpation.  NEUROLOGICAL: PERRLA.  Facial expression is symmetrical.  Hand grasps are equal bilaterally.  Normal sensation in all extremities when touched with finger.  Following commands appropriately.

## 2023-03-16 ENCOUNTER — HOSPITAL ENCOUNTER (EMERGENCY)
Facility: OTHER | Age: 63
Discharge: LEFT BEFORE DISCHARGE | End: 2023-03-16
Attending: EMERGENCY MEDICINE
Payer: MEDICAID

## 2023-03-16 VITALS
DIASTOLIC BLOOD PRESSURE: 59 MMHG | OXYGEN SATURATION: 98 % | HEART RATE: 55 BPM | RESPIRATION RATE: 16 BRPM | WEIGHT: 153 LBS | SYSTOLIC BLOOD PRESSURE: 125 MMHG | BODY MASS INDEX: 21.9 KG/M2 | HEIGHT: 70 IN

## 2023-03-16 DIAGNOSIS — M25.511 RIGHT SHOULDER PAIN: ICD-10-CM

## 2023-03-16 DIAGNOSIS — R07.9 CHEST PAIN: ICD-10-CM

## 2023-03-16 PROCEDURE — 99282 EMERGENCY DEPT VISIT SF MDM: CPT

## 2023-03-16 NOTE — ED TRIAGE NOTES
62 YOM presents to ED with c/o chronic bilateral neck pain that radiates to right arm x 1 year. Stated cp only when turns neck. Denies any recent trauma/injury. A&Ox4. Denies any other complaints.     LOC: The patient is awake, alert and aware of environment with an appropriate affect, the patient is oriented x 3.  APPEARANCE: Patient resting comfortably and in no acute distress, patient is clean and well groomed, patient's clothing is properly fastened.  SKIN: The skin is warm and dry, patient has normal skin turgor and moist mucus membranes, skin intact, no breakdown or brusing noted.  MUSKULOSKELETAL: Patient moving all extremities well, no obvious swelling or deformities noted.  RESPIRATORY: Airway is open and patent, respirations are spontaneous, patient has a normal effort and rate.  CARDIAC: No peripheral edema.  ABDOMEN: Soft and no tenderness to palpation, no distention noted.     ED workup in progress. VSS. Safety measures in place; side rails up x2. Call light within pt reach. Will continue to monitor.

## 2023-03-16 NOTE — ED PROVIDER NOTES
Encounter Date: 3/16/2023    SCRIBE #1 NOTE: I, Hermilo Jones am scribing for, and in the presence of,  Rosemary Triana PA-C. I have scribed the following portions of the note - Other sections scribed: HPI, ROS, PE.     History     Chief Complaint   Patient presents with    Neck Pain     Pt c.o pain in both side of neck radiates down right arm when tuning head. Pt states moving right arm produces pain. AAO x 3 nadn skin w.d  pt denies injury     Time seen by provider: 9:17 AM    This is a 62 y.o. male who presents with complaint of neck pain occurring intermittently for one year and worsening for the past month. He describes this pain radiating to his left shoulder, cross his upper back, into his chest, and down to his right elbow. Patient also adds that he slept out in the cold last night. This is the extent of the patient's complaints at this time.    The history is provided by the patient.   Review of patient's allergies indicates:  No Known Allergies  Past Medical History:   Diagnosis Date    Schizophrenia      Past Surgical History:   Procedure Laterality Date    CLAVICLE SURGERY Right 1976     Family History   Problem Relation Age of Onset    Cancer Mother         Unknown type     Social History     Tobacco Use    Smoking status: Former     Types: Cigarettes    Smokeless tobacco: Never    Tobacco comments:     Reports he quit around 4/1/19   Substance Use Topics    Alcohol use: No    Drug use: No     Review of Systems   Constitutional:  Negative for fever.   HENT:  Negative for sore throat.    Respiratory:  Negative for shortness of breath.    Cardiovascular:  Positive for chest pain.   Gastrointestinal:  Negative for nausea.   Genitourinary:  Negative for dysuria.   Musculoskeletal:  Positive for arthralgias, back pain and neck pain.   Skin:  Negative for rash.   Neurological:  Negative for weakness.   Hematological:  Does not bruise/bleed easily.     Physical Exam     Initial Vitals [03/16/23 0916]   BP  Pulse Resp Temp SpO2   (!) 125/59 (!) 55 16 -- 98 %      MAP       --         Physical Exam    Nursing note and vitals reviewed.  Constitutional: Vital signs are normal. He appears well-developed and well-nourished. He is cooperative.  Non-toxic appearance. He does not appear ill. No distress.   HENT:   Head: Normocephalic and atraumatic.   Eyes: Conjunctivae and lids are normal.   Neck: Trachea normal. Neck supple. No stridor present.   Cardiovascular:  Normal rate and regular rhythm.           Pulmonary/Chest: Effort normal. No tachypnea. No respiratory distress.   Abdominal: Abdomen is soft.   Musculoskeletal:      Cervical back: Neck supple.     Neurological: He is alert and oriented to person, place, and time. GCS eye subscore is 4. GCS verbal subscore is 5. GCS motor subscore is 6.   Skin: Skin is warm, dry and intact. No rash noted.   Psychiatric: He has a normal mood and affect. His speech is normal and behavior is normal. Thought content normal.       ED Course   Procedures  Labs Reviewed - No data to display       Imaging Results    None          Medications - No data to display  Medical Decision Making:   History:   Old Medical Records: I decided to obtain old medical records.  Initial Assessment:   Urgent evaluation of a 62-year-old male with right shoulder pain.  Plan for imaging, treat pain and reassess.  Differential Diagnosis:   Fracture, dislocation, compartment syndrome, nerve injury/palsy, vascular injury, DVT, rhabdomyolysis, hemarthrosis, septic joint, cellulitis, bursitis, muscle strain, ligament tear/sprain, laceration, foreign body, abrasion, soft tissue contusion, osteoarthritis.  Clinical Tests:   Lab Tests: Ordered  Radiological Study: Ordered        Scribe Attestation:   Scribe #1: I performed the above scribed service and the documentation accurately describes the services I performed. I attest to the accuracy of the note.  Provider Attestation for Scribe: I, Rosemary Triana PA-C,  reviewed documentation as scribed in my presence, which is both accurate and complete.        ED Course as of 03/16/23 1456   Thu Mar 16, 2023   1012 Per nursing staff, patient refusing all treatment and eloped. [DS]      ED Course User Index  [DS] Hermilo Jones                 Clinical Impression:   Final diagnoses:  [R07.9] Chest pain  [M25.511] Right shoulder pain        ED Disposition Condition    Eloped                 Rosemary Triana PA-C  03/16/23 2582

## 2023-03-16 NOTE — ED NOTES
"RN went in to patient room to start PIV and patient refused treatment. Pt stated "I been to every hospital, I dont wanna be here". Pt stated "I refuse treatment". Provider Rosemary notified and aware. Pt ambulatory w/ steady gait. Pt alert and oriented. Pt putting on jacket to leave. Pt left EC in no acute distress.   "

## 2023-08-25 ENCOUNTER — HOSPITAL ENCOUNTER (EMERGENCY)
Facility: OTHER | Age: 63
Discharge: HOME | End: 2023-08-26
Attending: EMERGENCY MEDICINE
Payer: MEDICAID

## 2023-08-25 VITALS
WEIGHT: 157 LBS | OXYGEN SATURATION: 99 % | HEIGHT: 70 IN | HEART RATE: 77 BPM | RESPIRATION RATE: 15 BRPM | SYSTOLIC BLOOD PRESSURE: 138 MMHG | BODY MASS INDEX: 22.48 KG/M2 | TEMPERATURE: 99 F | DIASTOLIC BLOOD PRESSURE: 73 MMHG

## 2023-08-25 DIAGNOSIS — G56.03 BILATERAL CARPAL TUNNEL SYNDROME: ICD-10-CM

## 2023-08-25 DIAGNOSIS — R20.0 NUMBNESS: Primary | ICD-10-CM

## 2023-08-25 PROCEDURE — 99281 EMR DPT VST MAYX REQ PHY/QHP: CPT

## 2023-08-26 RX ORDER — ASPIRIN 325 MG
325 TABLET ORAL
Status: DISCONTINUED | OUTPATIENT
Start: 2023-08-26 | End: 2023-08-26 | Stop reason: HOSPADM

## 2023-08-26 NOTE — ED PROVIDER NOTES
Encounter Date: 8/25/2023       History     Chief Complaint   Patient presents with    Numbness     Numbness to fingers on both hands x 1 week     62-year-old man presenting for evaluation of numbness in the fingers of both hands he says started after he drank Gatorade.    Says it feels like his circulation is slightly compromised.    Denies recent illnesses, injuries, fevers, chills has not take anything to try and help with this.      Review of patient's allergies indicates:  No Known Allergies  Past Medical History:   Diagnosis Date    Schizophrenia      Past Surgical History:   Procedure Laterality Date    CLAVICLE SURGERY Right 1976     Family History   Problem Relation Age of Onset    Cancer Mother         Unknown type     Social History     Tobacco Use    Smoking status: Former     Types: Cigarettes    Smokeless tobacco: Never    Tobacco comments:     Reports he quit around 4/1/19   Substance Use Topics    Alcohol use: No    Drug use: No     Review of Systems  Constitutional-no fever  HEENT-no congestion  Eyes-no redness  Respiratory-no shortness of breath  Cardio-no chest pain  GI-no abdominal pain  Endocrine-no cold intolerance  -no difficulty urinating  MSK-+ hand numbness  Skin-no rashes  Allergy-no environmental allergy  Neurologic-, no headache  Hematology-no swollen nodes  Behavioral-no confusion  Physical Exam     Initial Vitals [08/25/23 2331]   BP Pulse Resp Temp SpO2   138/73 77 15 98.6 °F (37 °C) 99 %      MAP       --         Physical Exam  Constitutional:  Slightly disheveled appearing 62-year-old man  Eyes: Conjunctivae normal.  ENT       Head: Normocephalic, atraumatic.       Nose: Normal external appearance        Mouth/Throat: no strigulous respirations   Hematological/Lymphatic/Immunilogical: no visible lymphadenopathy   Cardiovascular: Normal rate,   Respiratory: Normal respiratory effort.   Gastrointestinal: non distended   Musculoskeletal: Normal range of motion in all extremities. No  obvious deformities or swelling.  Mild paresthesias in the bilateral hands  Neurologic: Alert, oriented. Normal speech and language. No gross focal neurologic deficits are appreciated.  Skin: Skin is warm, dry. No rash noted.  Psychiatric: Mood and affect are normal.   ED Course   Procedures  Labs Reviewed - No data to display       Imaging Results    None          Medications - No data to display  Medical Decision Making  62-year-old man who appears to be homeless presenting for numbness in the hands consistent with likely carpal tunnel syndrome.    While undergoing event is ready for discharge, requests discharge, encouraged return in case of worsening symptoms and outpatient follow-up.    Amount and/or Complexity of Data Reviewed  External Data Reviewed: labs, radiology, ECG and notes.     Details: Presentations at outside facilities emergency department for paresthesias as well as arm pain                               Clinical Impression:   Final diagnoses:  [R20.0] Numbness (Primary)  [G56.03] Bilateral carpal tunnel syndrome        ED Disposition Condition    Discharge Stable          ED Prescriptions    None       Follow-up Information       Follow up With Specialties Details Why Contact [facility], [facility] Science Speech Pathology Go to  As needed [address] [address] [address]  [phone]               Stefano Edwards MD  08/26/23 4666

## 2023-08-26 NOTE — DISCHARGE INSTRUCTIONS
Mr. William,    Thank you for letting me care for you today! It was nice meeting you, and I hope you feel better soon.   If you would like access to your chart and what was done today please utilize the [facility].   Please come back to [facility] for all of your future medical needs.    Our goal in the emergency department is to always give you outstanding care and exceptional service. You may receive a survey by mail or e-mail in the next week regarding your experience in our ED. We would greatly appreciate you completing and returning the survey. Your feedback provides us with a way to recognize our staff who give very good care and it helps us learn how to improve when your experience was below our aspiration of excellence.     Sincerely,    Stefano Edwards MD  Board Certified Emergency Physician

## 2023-08-26 NOTE — ED NOTES
"Patient refused meds . States " I don't want no medicine and I don't want any Rx's . I just want my papers " . Will notify MD   "

## 2023-09-12 ENCOUNTER — HOSPITAL ENCOUNTER (EMERGENCY)
Facility: OTHER | Age: 63
Discharge: HOME | End: 2023-09-12
Attending: EMERGENCY MEDICINE
Payer: MEDICAID

## 2023-09-12 VITALS
BODY MASS INDEX: 22.53 KG/M2 | TEMPERATURE: 99 F | OXYGEN SATURATION: 100 % | DIASTOLIC BLOOD PRESSURE: 62 MMHG | WEIGHT: 157 LBS | RESPIRATION RATE: 18 BRPM | SYSTOLIC BLOOD PRESSURE: 131 MMHG | HEART RATE: 59 BPM

## 2023-09-12 DIAGNOSIS — Z76.5 MALINGERING: ICD-10-CM

## 2023-09-12 DIAGNOSIS — R44.0 AUDITORY HALLUCINATIONS: Primary | ICD-10-CM

## 2023-09-12 PROCEDURE — 99203 PR OFFICE/OUTPT VISIT, NEW, LEVL III, 30-44 MIN: ICD-10-PCS | Mod: 95,AF,HB, | Performed by: PSYCHIATRY & NEUROLOGY

## 2023-09-12 PROCEDURE — 99203 OFFICE O/P NEW LOW 30 MIN: CPT | Mod: 95,AF,HB, | Performed by: PSYCHIATRY & NEUROLOGY

## 2023-09-12 PROCEDURE — 99283 EMERGENCY DEPT VISIT LOW MDM: CPT

## 2023-09-12 PROCEDURE — 25000003 PHARM REV CODE 250: Performed by: NURSE PRACTITIONER

## 2023-09-12 RX ORDER — BENZTROPINE MESYLATE 1 MG/1
2 TABLET ORAL
Status: COMPLETED | OUTPATIENT
Start: 2023-09-12 | End: 2023-09-12

## 2023-09-12 RX ORDER — BENZTROPINE MESYLATE 1 MG/1
1 TABLET ORAL DAILY
Qty: 30 TABLET | Refills: 0 | Status: SHIPPED | OUTPATIENT
Start: 2023-09-12 | End: 2023-10-12

## 2023-09-12 RX ADMIN — BENZTROPINE MESYLATE 2 MG: 1 TABLET ORAL at 06:09

## 2023-09-12 NOTE — ED TRIAGE NOTES
Pt, who has a hx of Schizophrenia presents to the ER with complaints of auditory hallucinations for the past 2-3 weeks that have been telling him to hurt people. Pt reports a monthly injection that he is compliance with but states he has been out of his Cogentin for the past 2-3 weeks.

## 2023-09-12 NOTE — ED NOTES
LOC: The patient is awake, alert, and oriented to self, place, time, and situation. Pt is calm and cooperative. Affect is appropriate. Speech is appropriate moderately slurred; pt Is edentulous.     APPEARANCE: Patient resting comfortably in no acute distress.  Patient is clean and well groomed though he reports homelessness.    SKIN: The skin is warm and dry; color consistent with ethnicity.  Patient has normal skin turgor and moist mucus membranes.  Skin intact; no breakdown or bruising noted.     MUSCULOSKELETAL: Patient moving upper and lower extremities without difficulty; denies pain in the extremities or back.  Denies weakness.     RESPIRATORY: Airway is open and patent. Respirations spontaneous, even, easy, and non-labored.  Patient has a normal effort and rate.  No accessory muscle use noted. Denies cough.     CARDIAC:  Normal rate noted.  No peripheral edema noted. No complaints of chest pain.     ABDOMEN: Soft and non tender to palpation.  No distention noted. Pt denies abdominal pain; denies nausea, vomiting, diarrhea, or constipation.    NEUROLOGIC: Eyes open spontaneously.  Behavior appropriate to situation.  Follows commands; facial expression symmetrical.  Purposeful motor response noted; normal sensation in all extremities. Pt denies headache; denies lightheadedness or dizziness; denies visual disturbances; denies loss of balance; denies unilateral weakness.     BEHAVIORAL: Pt reporting auditory hallucinations.

## 2023-09-13 NOTE — CONSULTS
[facility]  Psychiatry  Telepsychiatry Consult Note    Please see previous notes:    Patient agreeable to consultation via telepsychiatry.    Tele-Consultation from Psychiatry started: 9/12/2023 at 7:20 PM  The chief complaint leading to psychiatric consultation is: Auditory Hallucinations  This consultation was requested by [facility], the Emergency Department attending physician.  The location of the consulting psychiatrist is [place], [place].  The patient location is  [facility] EMERGENCY DEPARTMENT   The patient arrived at the ED at: 6:00 PM    Also present with the patient at the time of the consultation: Nursing staff    Patient Identification:   Macario William is a 62 y.o. male.    Patient information was obtained from patient.  Patient presented voluntarily to the Emergency Department     Inpatient consult to Telemedicine - Psychiatry  Consult performed by: Álvaro Savage DO  Consult ordered by: Julia Carreon, ZAC        Teleconsult Time Documentation  Subjective:     History of Present Illness:  Per ED Triage: Pt, who has a hx of Schizophrenia presents to the ER with complaints of auditory hallucinations for the past 2-3 weeks that have been telling him to hurt people. Pt reports a monthly injection that he is compliance with but states he has been out of his Cogentin for the past 2-3 weeks.     On Interview:  Patient seen through teleconference this evening on my approach. He reports that he came to the hospital because he has been experiencing auditory hallucinations for the past 2-3 weeks and the voices are telling him that he needs to isolate himself from other people. In addition he has been under stress with not having an ID card so he is unable to stay at the shelter. He lost his ID a month ago when he was sleeping on the street. He denies any thoughts about wanting to hurt himself or anyone else. The only time he wants to hurt people is when they disturb him when he is  "trying to sleep. He reports that he receives a dose of Prolixin IM every two weeks. He receives his psychiatric treatment at [place] and he has his next appointment 9/21/2023. His main concern is having a safe place to sleep over night.     Psychiatric History:   Previous Psychiatric Hospitalizations: Yes   Previous Medication Trials: Yes   Previous Suicide Attempts: no   History of Violence: No  History of Depression: Yes  History of Radha: No  History of Auditory/Visual Hallucination Yes  History of Delusions: Yes  Outpatient psychiatrist (current & past): Yes    Substance Abuse History:  Tobacco:Yes  Alcohol: No  Illicit Substances:No  Detox/Rehab: No    Legal History: Past charges/incarcerations: Yes     Family Psychiatric History: No      Social History:  Developmental/Childhood:Achieved all developmental milestones timely  Education: 8th grade  Employment Status/Finances:Disabled   Relationship Status/Sexual Orientation: Single  Children: 0  Housing Status: Homeless    history:  NO  Access to gun: NO  Cheondoism:Actively participates in organized Taoism  Recreational activities: Watch sports  Patient was born and raised in [place]     Psychiatric Mental Status Exam:  Arousal: alert  Sensorium/Orientation: oriented to grossly intact  Behavior/Cooperation: normal, cooperative   Speech: normal tone, normal rate, normal pitch, normal volume  Language: grossly intact  Mood: " ok "   Affect: blunted  Thought Process: Linear, goal directed  Thought Content:   Auditory hallucinations: YES  Visual hallucinations: NO  Paranoia: NO  Delusions:  NO  Suicidal ideation: NO  Homicidal ideation: NO  Attention/Concentration:  intact  Memory:    Recent:  Intact   Remote: Intact   3/3 immediate, 3/3 at 5 min  Fund of Knowledge: Aware of current events   Abstract reasoning: proverbs were abstract, similarities were abstract  Insight: has awareness of illness  Judgment: Fair      Past Medical History:   Past " Medical History:   Diagnosis Date    Schizophrenia       Laboratory Data: Labs Reviewed - No data to display    Neurological History:  Seizures: No  Head trauma: No    Allergies:   Review of patient's allergies indicates:  No Known Allergies    Medications in ER:   Medications   benztropine tablet 2 mg (2 mg Oral Given 9/12/23 1856)       Medications at home:     No new subjective & objective note has been filed under this hospital service since the last note was generated.      Assessment - Diagnosis - Goals:     Diagnosis/Impression: Patient is a 62 year old man with a past psychiatric history of Schizophrenia who presented to the ED voluntarily with auditory hallucinations. On examination the patient denies any SI/HI/VH. He reports that his main concern is having a place to stay and getting resources for shelter.    Rec: Patient does not meet criteria for PEC as the patient is not a danger to self, others, or gravely disabled. Patient is clear from a psychiatric standpoint and can be discharged once medically stable.      Time with patient: 20 minutes      More than 50% of the time was spent counseling/coordinating care    Consulting clinician was informed of the encounter and consult note.    Consultation ended: 9/12/2023 at 7:40 PM    Álvaro Savage, DO   Psychiatry  [facility]

## 2023-09-13 NOTE — ED PROVIDER NOTES
Source of History:  Patient     Chief complaint:  Hallucinations (Auditory. Denies si, hi, physical symptoms)      HPI:  Macario William is a 62 y.o. male presenting with complaint of auditory hallucinations for the last few days.  Telling him to hurt himself however patient has no plan.  Denies SI or HI.  Reports out of Cogentin for the last 2 weeks.    Chart review reveals patient is a frequent Flyer to the emergency department usually for right shoulder pain.  He denies any complaints of that today.    This is the extent to the patients complaints today here in the emergency department.    PMH:  As per HPI and below:  Past Medical History:   Diagnosis Date    Schizophrenia      Past Surgical History:   Procedure Laterality Date    CLAVICLE SURGERY Right 1976       Social History     Tobacco Use    Smoking status: Every Day     Current packs/day: 0.50     Types: Cigarettes    Smokeless tobacco: Never    Tobacco comments:     Reports he quit around 4/1/19   Substance Use Topics    Alcohol use: No    Drug use: No     Review of patient's allergies indicates:  No Known Allergies    ROS: As per HPI and below:  Constitutional: No fever.  No chills.  Eyes: No visual changes.  ENT: No sore throat. No ear pain    Cardiovascular: No chest pain.  Respiratory: No shortness of breath.  GI: No abdominal pain.  No nausea or vomiting.  Genitourinary: No abnormal urination.  Neurologic: No headache. No focal weakness.  No numbness.  MSK: no back pain.  Integument: No rashes or lesions.  Hematologic: No easy bruising.  Endocrine: No excessive thirst or urination.  Psychiatric:  Auditory hallucinations    Physical Exam:    /62 (BP Location: Right arm, Patient Position: Sitting)   Pulse (!) 59   Temp 98.5 °F (36.9 °C) (Oral)   Resp 18   Wt 71.2 kg (157 lb)   SpO2 100%   BMI 22.53 kg/m²   Vitals:    09/12/23 1757   BP: 131/62   Pulse: (!) 59   Resp: 18   Temp: 98.5 °F (36.9 °C)   TempSrc: Oral   SpO2: 100%   Weight: 71.2 kg  (157 lb)     Nursing note and vital signs reviewed.  Constitutional: No acute distress.  Eyes: No conjunctival injection.  Extraocular muscles are intact.  ENT: Oropharynx clear.    Cardiovascular: Regular rate and rhythm.  No murmurs. No gallops. No rubs.  Respiratory: Clear to auscultation bilaterally.  Good air movement.  No wheezes.  No rhonchi. No rales. No accessory muscle use.  Abdomen: Soft.  Not distended.  Nontender.  No guarding.  No rebound. Non-peritoneal.  Neurologic:  Alert and oriented x3.  No focal neurological deficits.  Psychiatry:  Reporting auditory hallucinations, answering questions appropriately, no SI or HI.  Normal affect.    Labs that have been ordered have been independently reviewed and interpreted by myself.      Labs Reviewed - No data to display    No orders to display       MDM/ Differential Dx:  Decompensated psychiatric disease (schizophrenia, bipolar disorder, major depression), excited delirium, medication noncompliance, substance abuse/withdrawal, intentional drug overdose, medication toxicity, APAP/ASA overdose, acute stress reaction, personality disorder, malingering, metabolic derangement    62 y.o. male with auditory hallucination for the last few days.  Provided dose of his Cogentin in the ED.  Patient was tele psych and does not meet criteria for pec.  See psych consult note for further evaluation.  Will discharge patient home with a refill of his Cogentin.  Also provided mental health resources and .             Diagnostic Impression:    1. Auditory hallucinations    2. Malingering         ED Disposition Condition    Discharge Stable            ED Prescriptions       Medication Sig Dispense Start Date End Date Auth. Provider    benztropine (COGENTIN) 1 MG tablet Take 1 tablet (1 mg total) by mouth once daily. 30 tablet 9/12/2023 10/12/2023 Julia Carreon, ZAC          Follow-up Information       Follow up With Specialties Details Why Contact Info     [facility] - Emergency Dept Emergency Medicine Go to  If symptoms worsen [address]  [facility] [id number] [phone]             uJlia Carreon, [facility]  09/13/23 1601